# Patient Record
Sex: FEMALE | Race: WHITE | NOT HISPANIC OR LATINO | Employment: OTHER | URBAN - METROPOLITAN AREA
[De-identification: names, ages, dates, MRNs, and addresses within clinical notes are randomized per-mention and may not be internally consistent; named-entity substitution may affect disease eponyms.]

---

## 2017-01-12 ENCOUNTER — GENERIC CONVERSION - ENCOUNTER (OUTPATIENT)
Dept: OTHER | Facility: OTHER | Age: 82
End: 2017-01-12

## 2017-01-12 ENCOUNTER — APPOINTMENT (OUTPATIENT)
Dept: LAB | Facility: CLINIC | Age: 82
End: 2017-01-12
Payer: MEDICARE

## 2017-01-12 ENCOUNTER — TRANSCRIBE ORDERS (OUTPATIENT)
Dept: LAB | Facility: CLINIC | Age: 82
End: 2017-01-12

## 2017-01-12 DIAGNOSIS — E11.42 DIABETIC SENSORIMOTOR NEUROPATHY (HCC): ICD-10-CM

## 2017-01-12 DIAGNOSIS — E53.9 UNSPECIFIED VITAMIN B DEFICIENCY: ICD-10-CM

## 2017-01-12 DIAGNOSIS — Z79.4 ENCOUNTER FOR LONG-TERM (CURRENT) USE OF INSULIN (HCC): ICD-10-CM

## 2017-01-12 DIAGNOSIS — I25.10 UNSPECIFIED CARDIOVASCULAR DISEASE: ICD-10-CM

## 2017-01-12 DIAGNOSIS — I10 ESSENTIAL HYPERTENSION, MALIGNANT: ICD-10-CM

## 2017-01-12 DIAGNOSIS — E11.42 DIABETIC SENSORIMOTOR NEUROPATHY (HCC): Primary | ICD-10-CM

## 2017-01-12 DIAGNOSIS — Z92.29 HX: LONG TERM ANTICOAGULANT USE: ICD-10-CM

## 2017-01-12 DIAGNOSIS — E78.00 PURE HYPERCHOLESTEROLEMIA: ICD-10-CM

## 2017-01-12 DIAGNOSIS — N18.30 CHRONIC KIDNEY DISEASE, STAGE III (MODERATE) (HCC): ICD-10-CM

## 2017-01-12 LAB
ALBUMIN SERPL BCP-MCNC: 3.1 G/DL (ref 3.5–5)
ALP SERPL-CCNC: 98 U/L (ref 46–116)
ALT SERPL W P-5'-P-CCNC: 22 U/L (ref 12–78)
ANION GAP SERPL CALCULATED.3IONS-SCNC: 6 MMOL/L (ref 4–13)
AST SERPL W P-5'-P-CCNC: 14 U/L (ref 5–45)
BACTERIA UR QL AUTO: ABNORMAL /HPF
BILIRUB SERPL-MCNC: 0.6 MG/DL (ref 0.2–1)
BILIRUB UR QL STRIP: NEGATIVE
BUN SERPL-MCNC: 18 MG/DL (ref 5–25)
CALCIUM SERPL-MCNC: 9.1 MG/DL (ref 8.3–10.1)
CHLORIDE SERPL-SCNC: 107 MMOL/L (ref 100–108)
CHOLEST SERPL-MCNC: 84 MG/DL (ref 50–200)
CLARITY UR: ABNORMAL
CO2 SERPL-SCNC: 30 MMOL/L (ref 21–32)
COLOR UR: ABNORMAL
CREAT SERPL-MCNC: 1.01 MG/DL (ref 0.6–1.3)
CREAT UR-MCNC: 125 MG/DL
EST. AVERAGE GLUCOSE BLD GHB EST-MCNC: 151 MG/DL
GFR SERPL CREATININE-BSD FRML MDRD: 52.3 ML/MIN/1.73SQ M
GLUCOSE SERPL-MCNC: 95 MG/DL (ref 65–140)
GLUCOSE UR STRIP-MCNC: NEGATIVE MG/DL
HBA1C MFR BLD: 6.9 % (ref 4.2–6.3)
HDLC SERPL-MCNC: 35 MG/DL (ref 40–60)
HGB UR QL STRIP.AUTO: ABNORMAL
INR PPP: 2.77 (ref 0.86–1.16)
KETONES UR STRIP-MCNC: NEGATIVE MG/DL
LDLC SERPL CALC-MCNC: 29 MG/DL (ref 0–100)
LEUKOCYTE ESTERASE UR QL STRIP: NEGATIVE
MICROALBUMIN UR-MCNC: 13.8 MG/L (ref 0–20)
MICROALBUMIN/CREAT 24H UR: 11 MG/G CREATININE (ref 0–30)
NITRITE UR QL STRIP: NEGATIVE
NON-SQ EPI CELLS URNS QL MICRO: ABNORMAL /HPF
PH UR STRIP.AUTO: 7 [PH] (ref 4.5–8)
POTASSIUM SERPL-SCNC: 4.6 MMOL/L (ref 3.5–5.3)
PROT SERPL-MCNC: 7.4 G/DL (ref 6.4–8.2)
PROT UR STRIP-MCNC: ABNORMAL MG/DL
PROTHROMBIN TIME: 28.8 SECONDS (ref 12–14.3)
RBC #/AREA URNS AUTO: ABNORMAL /HPF
SODIUM SERPL-SCNC: 143 MMOL/L (ref 136–145)
SP GR UR STRIP.AUTO: 1.02 (ref 1–1.03)
TRIGL SERPL-MCNC: 98 MG/DL
TSH SERPL DL<=0.05 MIU/L-ACNC: 2.74 UIU/ML (ref 0.36–3.74)
UROBILINOGEN UR QL STRIP.AUTO: 0.2 E.U./DL
WBC #/AREA URNS AUTO: ABNORMAL /HPF

## 2017-01-12 PROCEDURE — 83883 ASSAY NEPHELOMETRY NOT SPEC: CPT

## 2017-01-12 PROCEDURE — 81001 URINALYSIS AUTO W/SCOPE: CPT

## 2017-01-12 PROCEDURE — 80061 LIPID PANEL: CPT

## 2017-01-12 PROCEDURE — 84443 ASSAY THYROID STIM HORMONE: CPT

## 2017-01-12 PROCEDURE — 85610 PROTHROMBIN TIME: CPT

## 2017-01-12 PROCEDURE — 80053 COMPREHEN METABOLIC PANEL: CPT

## 2017-01-12 PROCEDURE — 83036 HEMOGLOBIN GLYCOSYLATED A1C: CPT

## 2017-01-12 PROCEDURE — 82043 UR ALBUMIN QUANTITATIVE: CPT

## 2017-01-12 PROCEDURE — 36415 COLL VENOUS BLD VENIPUNCTURE: CPT

## 2017-01-12 PROCEDURE — 82570 ASSAY OF URINE CREATININE: CPT

## 2017-01-13 ENCOUNTER — GENERIC CONVERSION - ENCOUNTER (OUTPATIENT)
Dept: OTHER | Facility: OTHER | Age: 82
End: 2017-01-13

## 2017-01-13 LAB
KAPPA LC FREE SER-MCNC: 88.77 MG/L (ref 3.3–19.4)
KAPPA LC FREE/LAMBDA FREE SER: 3.36 {RATIO} (ref 0.26–1.65)
LAMBDA LC FREE SERPL-MCNC: 26.44 MG/L (ref 5.71–26.3)

## 2017-02-21 ENCOUNTER — GENERIC CONVERSION - ENCOUNTER (OUTPATIENT)
Dept: OTHER | Facility: OTHER | Age: 82
End: 2017-02-21

## 2017-02-21 ENCOUNTER — APPOINTMENT (OUTPATIENT)
Dept: LAB | Facility: CLINIC | Age: 82
End: 2017-02-21
Payer: MEDICARE

## 2017-02-21 DIAGNOSIS — I48.91 ATRIAL FIBRILLATION (HCC): ICD-10-CM

## 2017-02-21 LAB
INR PPP: 3.17 (ref 0.86–1.16)
PROTHROMBIN TIME: 31.9 SECONDS (ref 12–14.3)

## 2017-02-21 PROCEDURE — 85610 PROTHROMBIN TIME: CPT

## 2017-02-21 PROCEDURE — 36415 COLL VENOUS BLD VENIPUNCTURE: CPT

## 2017-02-22 ENCOUNTER — GENERIC CONVERSION - ENCOUNTER (OUTPATIENT)
Dept: OTHER | Facility: OTHER | Age: 82
End: 2017-02-22

## 2017-03-23 ENCOUNTER — GENERIC CONVERSION - ENCOUNTER (OUTPATIENT)
Dept: OTHER | Facility: OTHER | Age: 82
End: 2017-03-23

## 2017-03-23 ENCOUNTER — APPOINTMENT (OUTPATIENT)
Dept: LAB | Facility: CLINIC | Age: 82
End: 2017-03-23
Payer: MEDICARE

## 2017-03-23 DIAGNOSIS — I48.91 ATRIAL FIBRILLATION (HCC): ICD-10-CM

## 2017-03-23 LAB
INR PPP: 2.67 (ref 0.86–1.16)
PROTHROMBIN TIME: 28 SECONDS (ref 12–14.3)

## 2017-03-23 PROCEDURE — 36415 COLL VENOUS BLD VENIPUNCTURE: CPT

## 2017-03-23 PROCEDURE — 85610 PROTHROMBIN TIME: CPT

## 2017-04-06 DIAGNOSIS — I48.91 ATRIAL FIBRILLATION (HCC): ICD-10-CM

## 2017-04-24 ENCOUNTER — ALLSCRIPTS OFFICE VISIT (OUTPATIENT)
Dept: OTHER | Facility: OTHER | Age: 82
End: 2017-04-24

## 2017-04-27 ENCOUNTER — APPOINTMENT (OUTPATIENT)
Dept: LAB | Facility: CLINIC | Age: 82
End: 2017-04-27
Payer: MEDICARE

## 2017-04-27 ENCOUNTER — GENERIC CONVERSION - ENCOUNTER (OUTPATIENT)
Dept: OTHER | Facility: OTHER | Age: 82
End: 2017-04-27

## 2017-04-27 DIAGNOSIS — I48.91 ATRIAL FIBRILLATION (HCC): ICD-10-CM

## 2017-04-27 LAB
INR PPP: 3.03 (ref 0.86–1.16)
PROTHROMBIN TIME: 31.8 SECONDS (ref 12.1–14.4)

## 2017-04-27 PROCEDURE — 85610 PROTHROMBIN TIME: CPT

## 2017-04-27 PROCEDURE — 36415 COLL VENOUS BLD VENIPUNCTURE: CPT

## 2017-04-28 ENCOUNTER — GENERIC CONVERSION - ENCOUNTER (OUTPATIENT)
Dept: OTHER | Facility: OTHER | Age: 82
End: 2017-04-28

## 2017-05-12 ENCOUNTER — GENERIC CONVERSION - ENCOUNTER (OUTPATIENT)
Dept: OTHER | Facility: OTHER | Age: 82
End: 2017-05-12

## 2017-05-12 ENCOUNTER — APPOINTMENT (OUTPATIENT)
Dept: LAB | Facility: CLINIC | Age: 82
End: 2017-05-12
Payer: MEDICARE

## 2017-05-12 ENCOUNTER — TRANSCRIBE ORDERS (OUTPATIENT)
Dept: LAB | Facility: CLINIC | Age: 82
End: 2017-05-12

## 2017-05-12 DIAGNOSIS — E11.00 UNCONTROLLED TYPE 2 DIABETES MELLITUS WITH HYPEROSMOLARITY WITHOUT COMA, WITHOUT LONG-TERM CURRENT USE OF INSULIN (HCC): Primary | ICD-10-CM

## 2017-05-12 DIAGNOSIS — I48.91 ATRIAL FIBRILLATION (HCC): ICD-10-CM

## 2017-05-12 LAB
ALBUMIN SERPL BCP-MCNC: 3.3 G/DL (ref 3.5–5)
ALP SERPL-CCNC: 104 U/L (ref 46–116)
ALT SERPL W P-5'-P-CCNC: 20 U/L (ref 12–78)
ANION GAP SERPL CALCULATED.3IONS-SCNC: 5 MMOL/L (ref 4–13)
AST SERPL W P-5'-P-CCNC: 18 U/L (ref 5–45)
BILIRUB SERPL-MCNC: 0.54 MG/DL (ref 0.2–1)
BUN SERPL-MCNC: 18 MG/DL (ref 5–25)
CALCIUM SERPL-MCNC: 8.9 MG/DL (ref 8.3–10.1)
CHLORIDE SERPL-SCNC: 105 MMOL/L (ref 100–108)
CHOLEST SERPL-MCNC: 104 MG/DL (ref 50–200)
CO2 SERPL-SCNC: 30 MMOL/L (ref 21–32)
CREAT SERPL-MCNC: 0.99 MG/DL (ref 0.6–1.3)
EST. AVERAGE GLUCOSE BLD GHB EST-MCNC: 157 MG/DL
GFR SERPL CREATININE-BSD FRML MDRD: 53.6 ML/MIN/1.73SQ M
GLUCOSE P FAST SERPL-MCNC: 111 MG/DL (ref 65–99)
HBA1C MFR BLD: 7.1 % (ref 4.2–6.3)
HDLC SERPL-MCNC: 34 MG/DL (ref 40–60)
INR PPP: 3.05 (ref 0.86–1.16)
LDLC SERPL CALC-MCNC: 44 MG/DL (ref 0–100)
POTASSIUM SERPL-SCNC: 4.3 MMOL/L (ref 3.5–5.3)
PROT SERPL-MCNC: 7.3 G/DL (ref 6.4–8.2)
PROTHROMBIN TIME: 32 SECONDS (ref 12.1–14.4)
SODIUM SERPL-SCNC: 140 MMOL/L (ref 136–145)
TRIGL SERPL-MCNC: 131 MG/DL

## 2017-05-12 PROCEDURE — 80061 LIPID PANEL: CPT

## 2017-05-12 PROCEDURE — 83036 HEMOGLOBIN GLYCOSYLATED A1C: CPT

## 2017-05-12 PROCEDURE — 85610 PROTHROMBIN TIME: CPT

## 2017-05-12 PROCEDURE — 36415 COLL VENOUS BLD VENIPUNCTURE: CPT

## 2017-05-12 PROCEDURE — 80053 COMPREHEN METABOLIC PANEL: CPT

## 2017-06-29 ENCOUNTER — GENERIC CONVERSION - ENCOUNTER (OUTPATIENT)
Dept: OTHER | Facility: OTHER | Age: 82
End: 2017-06-29

## 2017-06-29 ENCOUNTER — APPOINTMENT (OUTPATIENT)
Dept: LAB | Facility: CLINIC | Age: 82
End: 2017-06-29
Payer: MEDICARE

## 2017-06-29 ENCOUNTER — TRANSCRIBE ORDERS (OUTPATIENT)
Dept: LAB | Facility: CLINIC | Age: 82
End: 2017-06-29

## 2017-06-29 DIAGNOSIS — I48.91 ATRIAL FIBRILLATION, UNSPECIFIED TYPE (HCC): Primary | ICD-10-CM

## 2017-06-29 DIAGNOSIS — I48.91 ATRIAL FIBRILLATION, UNSPECIFIED TYPE (HCC): ICD-10-CM

## 2017-06-29 LAB
INR PPP: 2.94 (ref 0.86–1.16)
PROTHROMBIN TIME: 31.1 SECONDS (ref 12.1–14.4)

## 2017-06-29 PROCEDURE — 36415 COLL VENOUS BLD VENIPUNCTURE: CPT

## 2017-06-29 PROCEDURE — 85610 PROTHROMBIN TIME: CPT

## 2017-07-31 ENCOUNTER — APPOINTMENT (OUTPATIENT)
Dept: LAB | Facility: CLINIC | Age: 82
End: 2017-07-31
Payer: MEDICARE

## 2017-07-31 DIAGNOSIS — I48.91 ATRIAL FIBRILLATION, UNSPECIFIED TYPE (HCC): Primary | ICD-10-CM

## 2017-07-31 LAB
INR PPP: 2.88 (ref 0.86–1.16)
PROTHROMBIN TIME: 30.6 SECONDS (ref 12.1–14.4)

## 2017-07-31 PROCEDURE — 36415 COLL VENOUS BLD VENIPUNCTURE: CPT

## 2017-07-31 PROCEDURE — 85610 PROTHROMBIN TIME: CPT

## 2017-08-11 ENCOUNTER — APPOINTMENT (OUTPATIENT)
Dept: LAB | Facility: CLINIC | Age: 82
End: 2017-08-11
Payer: MEDICARE

## 2017-08-11 DIAGNOSIS — E11.65 UNCONTROLLED TYPE 2 DIABETES MELLITUS WITH COMPLICATION, UNSPECIFIED LONG TERM INSULIN USE STATUS: Primary | ICD-10-CM

## 2017-08-11 DIAGNOSIS — E11.8 UNCONTROLLED TYPE 2 DIABETES MELLITUS WITH COMPLICATION, UNSPECIFIED LONG TERM INSULIN USE STATUS: Primary | ICD-10-CM

## 2017-08-11 LAB
ALBUMIN SERPL BCP-MCNC: 3.1 G/DL (ref 3.5–5)
ALP SERPL-CCNC: 111 U/L (ref 46–116)
ALT SERPL W P-5'-P-CCNC: 20 U/L (ref 12–78)
ANION GAP SERPL CALCULATED.3IONS-SCNC: 7 MMOL/L (ref 4–13)
AST SERPL W P-5'-P-CCNC: 17 U/L (ref 5–45)
BILIRUB SERPL-MCNC: 0.5 MG/DL (ref 0.2–1)
BILIRUB UR QL STRIP: NEGATIVE
BUN SERPL-MCNC: 24 MG/DL (ref 5–25)
CALCIUM SERPL-MCNC: 9.1 MG/DL (ref 8.3–10.1)
CHLORIDE SERPL-SCNC: 105 MMOL/L (ref 100–108)
CHOLEST SERPL-MCNC: 89 MG/DL (ref 50–200)
CLARITY UR: CLEAR
CO2 SERPL-SCNC: 29 MMOL/L (ref 21–32)
COLOR UR: YELLOW
CREAT SERPL-MCNC: 1.12 MG/DL (ref 0.6–1.3)
CREAT UR-MCNC: 30.8 MG/DL
EST. AVERAGE GLUCOSE BLD GHB EST-MCNC: 163 MG/DL
GFR SERPL CREATININE-BSD FRML MDRD: 46 ML/MIN/1.73SQ M
GLUCOSE P FAST SERPL-MCNC: 120 MG/DL (ref 65–99)
GLUCOSE UR STRIP-MCNC: NEGATIVE MG/DL
HBA1C MFR BLD: 7.3 % (ref 4.2–6.3)
HDLC SERPL-MCNC: 33 MG/DL (ref 40–60)
HGB UR QL STRIP.AUTO: NEGATIVE
KETONES UR STRIP-MCNC: NEGATIVE MG/DL
LDLC SERPL CALC-MCNC: 33 MG/DL (ref 0–100)
LEUKOCYTE ESTERASE UR QL STRIP: NEGATIVE
MICROALBUMIN UR-MCNC: <5 MG/L (ref 0–20)
MICROALBUMIN/CREAT 24H UR: <16 MG/G CREATININE (ref 0–30)
NITRITE UR QL STRIP: NEGATIVE
PH UR STRIP.AUTO: 6 [PH] (ref 4.5–8)
POTASSIUM SERPL-SCNC: 4.5 MMOL/L (ref 3.5–5.3)
PROT SERPL-MCNC: 7.5 G/DL (ref 6.4–8.2)
PROT UR STRIP-MCNC: NEGATIVE MG/DL
SODIUM SERPL-SCNC: 141 MMOL/L (ref 136–145)
SP GR UR STRIP.AUTO: 1.01 (ref 1–1.03)
T4 FREE SERPL-MCNC: 1.03 NG/DL (ref 0.76–1.46)
TRIGL SERPL-MCNC: 114 MG/DL
TSH SERPL DL<=0.05 MIU/L-ACNC: 2.84 UIU/ML (ref 0.36–3.74)
UROBILINOGEN UR QL STRIP.AUTO: 0.2 E.U./DL

## 2017-08-11 PROCEDURE — 81003 URINALYSIS AUTO W/O SCOPE: CPT

## 2017-08-11 PROCEDURE — 80061 LIPID PANEL: CPT

## 2017-08-11 PROCEDURE — 82043 UR ALBUMIN QUANTITATIVE: CPT

## 2017-08-11 PROCEDURE — 82570 ASSAY OF URINE CREATININE: CPT

## 2017-08-11 PROCEDURE — 83036 HEMOGLOBIN GLYCOSYLATED A1C: CPT

## 2017-08-11 PROCEDURE — 36415 COLL VENOUS BLD VENIPUNCTURE: CPT

## 2017-08-11 PROCEDURE — 84443 ASSAY THYROID STIM HORMONE: CPT

## 2017-08-11 PROCEDURE — 80053 COMPREHEN METABOLIC PANEL: CPT

## 2017-08-11 PROCEDURE — 84439 ASSAY OF FREE THYROXINE: CPT

## 2017-09-06 ENCOUNTER — GENERIC CONVERSION - ENCOUNTER (OUTPATIENT)
Dept: OTHER | Facility: OTHER | Age: 82
End: 2017-09-06

## 2017-09-06 ENCOUNTER — APPOINTMENT (OUTPATIENT)
Dept: LAB | Facility: CLINIC | Age: 82
End: 2017-09-06
Payer: MEDICARE

## 2017-09-06 DIAGNOSIS — I48.91 ATRIAL FIBRILLATION, UNSPECIFIED TYPE (HCC): Primary | ICD-10-CM

## 2017-09-06 LAB
INR PPP: 2.41 (ref 0.86–1.16)
PROTHROMBIN TIME: 26.5 SECONDS (ref 12.1–14.4)

## 2017-09-06 PROCEDURE — 36415 COLL VENOUS BLD VENIPUNCTURE: CPT

## 2017-09-06 PROCEDURE — 85610 PROTHROMBIN TIME: CPT

## 2017-09-08 ENCOUNTER — GENERIC CONVERSION - ENCOUNTER (OUTPATIENT)
Dept: OTHER | Facility: OTHER | Age: 82
End: 2017-09-08

## 2017-10-05 ENCOUNTER — APPOINTMENT (OUTPATIENT)
Dept: LAB | Facility: CLINIC | Age: 82
End: 2017-10-05
Payer: MEDICARE

## 2017-10-05 ENCOUNTER — LAB CONVERSION - ENCOUNTER (OUTPATIENT)
Dept: OTHER | Facility: OTHER | Age: 82
End: 2017-10-05

## 2017-10-05 DIAGNOSIS — I48.91 ATRIAL FIBRILLATION, UNSPECIFIED TYPE (HCC): Primary | ICD-10-CM

## 2017-10-05 LAB
INR PPP: 2.94 (ref 0.86–1.16)
PROTHROMBIN TIME: 31.1 SECONDS (ref 12.1–14.4)

## 2017-10-05 PROCEDURE — 36415 COLL VENOUS BLD VENIPUNCTURE: CPT

## 2017-10-05 PROCEDURE — 85610 PROTHROMBIN TIME: CPT

## 2017-10-24 ENCOUNTER — ALLSCRIPTS OFFICE VISIT (OUTPATIENT)
Dept: OTHER | Facility: OTHER | Age: 82
End: 2017-10-24

## 2017-10-25 NOTE — PROGRESS NOTES
Assessment  Assessed    1  Adequate anticoagulation on anticoagulant therapy (V58 61) (Z79 01)   2  Arteriosclerosis of coronary artery (414 00) (I25 10)   3  Atrial fibrillation (427 31) (I48 91)   4  Benign essential hypertension (401 1) (I10)   5  Leg swelling (729 81) (M79 89)    Discussion/Summary  Cardiology Discussion Summary Free Text Note Form [facility]:   Patient with multiple medical problems who seems to be doing reasonably well from cardiac standpoint  Previous studies reviewed with patient  Medications reviewed and possible side effects discussed  concepts of cardiovascular disease , signs and symptoms of heart disease  Dietary and risk factor modification reinforced  All questions answered  Safety measures reviewed  Patient advised to report any problems prompting medical attention  Patient understands the risks and benefits of anticoagulation to prevent thromboembolic risk from atrial fibrillation  Target INR 2 to 3  Patient will continue to follow up with pacemaker clinic  Importance of salt restriction reinforced  All her medications were reviewed  Patient will continue to follow up with her primary care physician  Followup in 6 months  Goals and Barriers: The patient has the current Goals: Chronic anticoagulation  The patent has the current Barriers: None  Patient's Capacity to Self-Care: Patient is able to Self-Care  Patient Education: Educational resources provided: Please see discussion summary  Medication SE Review and Pt Understands Tx: Possible side effects of new medications were reviewed with the patient/guardian today  Counseling Documentation With Imm: The patient, patient's family was counseled regarding instructions for management,-- risk factor reductions,-- impressions,-- risks and benefits of treatment options,-- importance of compliance with treatment        Chief Complaint  Chief Complaint Chronic Condition [facility]: Patient is here today for follow up of chronic conditions described in HPI  History of Present Illness  Cardiology [facility] Free Text Note Form [facility]: Patient presents for follow-up visit  Patient denies any history of chest pain  No shortness of breath out of the ordinary  No history of leg edema more than usual  No history of orthopnea PND  Patient is on anticoagulation  Patient denies any bleeding issues  She reports that she has been compliant with all her present medications  She is also regular with her pacemaker checks  Review of Systems  Cardiology Female ROS:     Cardiac: as noted in HPI  Skin: No complaints of nonhealing sores or skin rash  Genitourinary: No complaints of recurrent urinary tract infections, frequent urination at night, difficult urination, blood in urine, kidney stones, loss of bladder control, kidney problems, denies any birth control or hormone replacement, is not post menopausal, not currently pregnant  Psychological: anxiety  General: No complaints of trouble sleeping, lack of energy, fatigue, appetite changes, weight changes, fever, frequent infections, or night sweats  Respiratory: No complaints of shortness of breath, cough with sputum, or wheezing  HEENT: No complaints of serious problems, hearing problems, nose problems, throat problems, or snoring  Gastrointestinal: No complaints of liver problems, nausea, vomiting, heartburn, constipation, bloody stools, diarrhea, problems swallowing, adbominal pain, or rectal bleeding  Hematologic: No complaints of bleeding disorders, anemia, blood clots, or excessive brusing  Neurological: No complaints of numbness, tingling, dizziness, weakness, seizures, headaches, syncope or fainting, AM fatigue, daytime sleepiness, no witnessed apnea episodes  Musculoskeletal: arthritis   ROS Reviewed:   ROS reviewed  Active Problems  Problems    1  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)   2  Adequate anticoagulation on anticoagulant therapy (U08 61) (Z79 01)   3  Arteriosclerosis of coronary artery (414 00) (I25 10)   4  Atherosclerotic peripheral vascular disease (440 20) (I70 209)   5  Atrial fibrillation (427 31) (I48 91)   6  Benign essential hypertension (401 1) (I10)   7  Callus (700) (L84)   8  Capsulitis (726 90) (M77 9)   9  Chronic diastolic congestive heart failure (428 32,428 0) (I50 32)   10  Coronary atherosclerosis of native coronary artery (414 01) (I25 10)   11  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)   12  Diabetes mellitus with polyneuropathy (250 60,357 2) (E11 42)   13  Difficulty in walking (719 7) (R26 2)   14  Dyspnea on effort (786 09) (R06 09)   15  Fissure of skin (709 8) (R23 4)   16  Hyperlipidemia (272 4) (E78 5)   17  Hypertension (401 9) (I10)   18  Ingrowing nail (703 0) (L60 0)   19  Leg swelling (729 81) (M79 89)   20  Onychomycosis (110 1) (B35 1)   21  Pain in extremity (729 5) (M79 609)   22  Presence of cardiac pacemaker (V45 01) (Z95 0)   23  Sinoatrial node dysfunction (427 81) (I49 5)   24  Tinea pedis (110 4) (B35 3)   25  Xerosis cutis (706 8) (L85 3)    Past Medical History  Problems    1  Anticoagulant long-term use (V58 61) (Z79 01)   2  Atrial fibrillation (427 31) (I48 91)   3  History of Congestive heart failure (428 0) (I50 9)   4  History of Difficulty breathing (786 09) (R06 89)   5  History of chest pain (V13 89) (Z87 898)   6  History of shortness of breath (V13 89) (Z87 898)   7  History of sinoatrial node dysfunction (V12 59) (Z86 79)   8  Hyperlipidemia (272 4) (E78 5)   9  Hypertension (401 9) (I10)   10  History of Long term use of drug (V58 69) (Z79 899)   11  Personal history of coronary atherosclerosis (V12 59) (Z86 79)   12  History of Status post angioplasty (V45 89) (Z98 62)   13  History of Type 2 diabetes mellitus (250 00) (E11 9)  Active Problems And Past Medical History Reviewed: The active problems and past medical history were reviewed and updated today  Surgical History  Problems    1  History of Appendectomy   2  History of Cardiac Cath Procedure Outcome: Successful   3  History of Cataract Surgery   4  History of Cath Laser Angioplasty   5  History of Cath Stent Placement   6  History of Cholecystectomy   7  History of Pacemaker Placement   8  History of PTCA   9  History of Tubal Ligation  Surgical History Reviewed: The surgical history was reviewed and updated today  Family History  Mother    1  Family history of coronary artery disease (V17 3) (Z82 49)  Father    2  Family history of coronary artery disease (V17 3) (Z82 49)   3  Family history of Hypertension (401 9) (I10)  Family History    4  Family history of Coronary Artery Disease (V17 49)  Family History Reviewed: The family history was reviewed and updated today  Social History  Problems    · Denied: History of Alcohol Use (History)   · Never A Smoker  Social History Reviewed: The social history was reviewed and updated today  Current Meds   1  Aspirin EC 81 MG Oral Tablet Delayed Release; TAKE 1 TABLET DAILY; Therapy: (Recorded:08Jan2014) to Recorded   2  Atorvastatin Calcium 20 MG Oral Tablet; Therapy: 01YXI8493 to Recorded   3  BD Pen Needle Mini U/F 31G X 5 MM Miscellaneous; Therapy: 51SCW8681 to (Evaluate:11Ojw1673) Recorded   4  BD Pen Needle Short U/F 31G X 8 MM Miscellaneous; Therapy: 33IXF6836 to Recorded   5  Enalapril Maleate 10 MG Oral Tablet; TAKE 1 TABLET DAILY; Therapy: (Recorded:08Jan2014) to Recorded   6  Furosemide 40 MG Oral Tablet; TAKE 1 TABLET TWICE DAILY; Therapy: 16ETQ0956 to (Erick Fay)  Requested for: 27Jun2017; Last   Rx:27Jun2017 Ordered   7  HumaLOG KwikPen SOLN; 5 BREAKFAST 9 AT LUNCH  11 AT [facility]; Therapy: (Recorded:08Jan2014) to Recorded   8  Klor-Con 10 10 MEQ Oral Tablet Extended Release; TAKE 1 TABLET TWICE DAILY    Requested for: 78AAI1983; Last Rx:09Apr2015 Ordered   9   Levemir FlexPen 100 UNIT/ML SOPN;   Therapy: 55QWI6112 to (Evaluate:58Ved1637) Recorded   10  Metoprolol Tartrate 50 MG Oral Tablet; TAKE 1 TABLET TWICE DAILY; Therapy: (Recorded:08Jan2014) to Recorded   11  Onglyza 2 5 MG Oral Tablet; 1 PO QD; Therapy: (Recorded:08Jan2014) to Recorded   12  Potassium Chloride ER 10 MEQ Oral Tablet Extended Release; TAKE 1 TABLET TWICE    DAILY; Therapy: 62BPZ2034 to (Evaluate:07Apr2018)  Requested for: 12Apr2017; Last    Rx:12Apr2017 Ordered   13  Vitamin B12 TABS; 2000 MCG 1 TAB PO QD; Therapy: (Recorded:08Jan2014) to Recorded   14  Warfarin Sodium 5 MG Oral Tablet; TAKE 1 TO 2 TABLETS BY MOUTH EVERY DAY AS    DIRECTED; Therapy: 93ZBU3794 to (Lupe Smith)  Requested for: 01Sep2017; Last    Rx:01Sep2017 Ordered  Medication List Reviewed: The medication list was reviewed and updated today  Allergies  Medication    1  Anacin TABS   2  Neosporin SOLN    Vitals  Vital Signs    Recorded: 09VTW0066 09:22AM   Heart Rate 74   Systolic 061   Diastolic 62   Height 5 ft 7 in   Weight 203 lb    BMI Calculated 31 79   BSA Calculated 2 03   O2 Saturation 97     Physical Exam    Constitutional   General appearance: No acute distress, well appearing and well nourished  Eyes   Conjunctiva and Sclera examination: Conjunctiva pink, sclera anicteric  Ears, Nose, Mouth, and Throat - Oropharynx: Clear, nares are clear, mucous membranes are moist    Neck   Neck and thyroid: Normal, supple, trachea midline, no thyromegaly  Pulmonary   Respiratory effort: No increased work of breathing or signs of respiratory distress  Auscultation of lungs: Clear to auscultation, no rales, no rhonchi, no wheezing, good air movement  Cardiovascular   Auscultation of heart: Abnormal  -- Irregularly irregular  Carotid pulses: Normal, 2+ bilaterally  Peripheral vascular exam: Normal pulses throughout, no tenderness, erythema or swelling  Pedal pulses: Normal, 2+ bilaterally      Examination of extremities for edema and/or varicosities: Abnormal  -- 1+ edema  Abdomen   Abdomen: Non-tender and no distention  Liver and spleen: No hepatomegaly or splenomegaly  Musculoskeletal Gait and station: Normal gait  -- Digits and nails: Normal without clubbing or cyanosis  -- Inspection/palpation of joints, bones, and muscles: Normal, ROM normal     Skin - Skin and subcutaneous tissue: Normal without rashes or lesions  Skin is warm and well perfused, normal turgor  Neurologic - Cranial nerves: II - XII intact  -- Speech: Normal     Psychiatric - Orientation to person, place, and time: Normal -- Mood and affect: Normal       Results/Data  Diagnostic Studies Reviewed Cardio: I personally reviewed the recording/images in the office today  My interpretation follows  ICD/ Pacer Interpretation Pacemaker interrogation data reviewed  Normally functioning pacemaker  Normal lead impedance  Battery status is good  Reviewed with patient and family        Future Appointments    Date/Time Provider Specialty Site   10/12/2018 09:00 AM Cardiology, Pacemaker Clin [facility] 391     Signatures   Electronically signed by : TONY Alexander ; Oct 24 2017  2:18PM EST                       (Author)

## 2017-11-02 ENCOUNTER — APPOINTMENT (OUTPATIENT)
Dept: LAB | Facility: CLINIC | Age: 82
End: 2017-11-02
Payer: MEDICARE

## 2017-11-02 ENCOUNTER — GENERIC CONVERSION - ENCOUNTER (OUTPATIENT)
Dept: OTHER | Facility: OTHER | Age: 82
End: 2017-11-02

## 2017-11-02 DIAGNOSIS — I48.91 ATRIAL FIBRILLATION, UNSPECIFIED TYPE (HCC): Primary | ICD-10-CM

## 2017-11-02 LAB
INR PPP: 2.83 (ref 0.86–1.16)
PROTHROMBIN TIME: 30.1 SECONDS (ref 12.1–14.4)

## 2017-11-02 PROCEDURE — 85610 PROTHROMBIN TIME: CPT

## 2017-11-02 PROCEDURE — 36415 COLL VENOUS BLD VENIPUNCTURE: CPT

## 2017-11-03 ENCOUNTER — GENERIC CONVERSION - ENCOUNTER (OUTPATIENT)
Dept: OTHER | Facility: OTHER | Age: 82
End: 2017-11-03

## 2017-12-06 ENCOUNTER — GENERIC CONVERSION - ENCOUNTER (OUTPATIENT)
Dept: OTHER | Facility: OTHER | Age: 82
End: 2017-12-06

## 2017-12-06 ENCOUNTER — APPOINTMENT (OUTPATIENT)
Dept: LAB | Facility: CLINIC | Age: 82
End: 2017-12-06
Payer: MEDICARE

## 2017-12-06 DIAGNOSIS — I48.91 ATRIAL FIBRILLATION, UNSPECIFIED TYPE (HCC): Primary | ICD-10-CM

## 2017-12-06 LAB
INR PPP: 3.03 (ref 0.86–1.16)
PROTHROMBIN TIME: 31.8 SECONDS (ref 12.1–14.4)

## 2017-12-06 PROCEDURE — 85610 PROTHROMBIN TIME: CPT

## 2017-12-06 PROCEDURE — 36415 COLL VENOUS BLD VENIPUNCTURE: CPT

## 2018-01-09 NOTE — RESULT NOTES
Verified Results  (1) PT WITH INR 29Jun2017 11:36AM Crystal Zamora     Test Name Result Flag Reference   INR 2 94 H 0 86-1 16   PT 31 1 seconds H 12 1-14 4

## 2018-01-09 NOTE — PROGRESS NOTES
REPORT NAME: Patient Visit Summary Report   VISIT DATE: 12/9/2016  VISIT TIME: 1:21 PM EST  PATIENT NAME: Lazaro Cho  MEDICAL RECORD NUMBER: [id number]  SOCIAL SECURITY NUMBER:    YOB: 1934  AGE: 80  REFERRING PHYSICIAN: Crystal Zamora  SUPERVISING CLINICIAN: Crystal Zamora  HEALTH CARE PROFESSIONAL: Lay Paniagua  [facility] [address]  [address] PHONE:  [phone]  DIAGNOSIS 1: Atrial Fibrillation / 427 31  DIAGNOSIS 2: Long-term (current) use of anticoagulants / V58 61  DIAGNOSIS 3:   DIAGNOSIS 4:   INR RANGE: 2 - 3  INR GOAL: 2 5  TREATMENT START DATE:   TREATMENT END DATE:    NEXT VISIT:       VISIT RESULTS   ENCOUNTER NUMBER:   TEST LOCATION: Other - see notes  TEST TYPE: Outside Lab (Venipuncture)  VISIT TYPE:   CURRENT INR: 3 15 PROTIME:   SPECIMEN COL AND RPT DATE: 12/9/2016 1:21 PM  EST     VITAL SIGNS  PULSE:  B/P:  WEIGHT:  HEIGHT:  TEMP:     CURRENT ANTICOAGULANT DOSING SCHEDULE  DOSE SIZE: 5mg    ANTICOAGULANT TYPE: COUMADIN  DOSING REGIMEN  Sun       Mon       Tues      Wed       Thurs     Fri       Sat  Total/Wk   5         5         5         5         5         5         5         35    PATIENT MEDICATION INSTRUCTION: Yes  PATIENT NUTRITIONAL COUNSELING: No  PATIENT BRUISING INSTRUCTION: No      LAST EDUCATION DATE:       PREVIOUS VISIT  INFORMATION  VISITDATE   INR Goal  INR   Sun     Mon     Tues    Wed     Thurs   Fri  Sat     Total/wk  12/9/2016   2 5       3 15  5       5       5       5       5       5  5       35  10/31/2016  2 5       3 12  5       5       5        5       5       5  5       35  9/23/2016   2 5       3 24  5       5       5       5       5       5  5       35  8/26/2016   2 5       2 65  5       5       5       5       5       5  5       35    ADDITIONAL PREVIOUS VISIT INFORMATION   VISITDATE   PRIMARY RX               DOSE      CrCl  12/9/2016   COUMADIN                 5mg 10/31/2016  COUMADIN                 5mg                 9/23/2016   COUMADIN                 5mg                 8/26/2016   COUMADIN                  5mg                     OTHER CURRENT MEDICATIONS: COUMADIN      PROGRESS NOTES: Same Dose rechk 2 weeks    PATIENT INSTRUCTIONS: LMOM    TEST LOCATION: Other - see notes    Electronically signed by: Wicho Krishna  on 12/9/2016 at 1:21 PM EST              Electronically signed by:Crystal CORTES    Dec 11 2016  1:00PM EST

## 2018-01-09 NOTE — RESULT NOTES
Verified Results  (1) PT WITH INR [id number] 01:12PM Crystal Zamora     Test Name Result Flag Reference   INR 2 93 H 0 86-1 16   PT 29 0 seconds H 11 8-14 1

## 2018-01-09 NOTE — PROGRESS NOTES
REPORT NAME: Progress Notes Report  VISIT DATE: 11/3/2017  VISIT TIME: 8:56 AM EDT  PATIENT NAME: Odin Bradley  MEDICAL RECORD NUMBER: [id number]  YOB: 1934  AGE: 80  REFERRING PHYSICIAN: Crystal Zamora  SUPERVISING CLINICIAN: Alisia Fatima CARE PROVIDER: Laverne Echavarria  PATIENT HOME ADDRESS: [address]  [address] PHONE: [phone]  SOCIAL SECURITY NUMBER:   DIAGNOSIS 1: Atrial Fibrillation / 427 31  DIAGNOSIS 2: Long-term (current) use of anticoagulants / V58 61  INR RANGE: 2 - 3  INR GOAL: 2 5  TREATMENT START DATE:   TREATMENT END DATE:   NEXT VISIT:     VISIT RESULTS  ENCOUNTER NUMBER:   TEST LOCATION: Other - see notes  TEST TYPE: Outside Lab (Venipuncture)  VISIT TYPE:   CURRENT INR: 2 83 PROTIME:   SPECIMEN COL AND RPT DATE: 11/3/2017 8:56 AM  EDT  VITAL SIGNS  PULSE:  BP: / WEIGHT:  HEIGHT:  TEMP:   CURRENT ANTICOAGULANT DOSING SCHEDULE  DOSE SIZE: 5mg    ANTICOAGULANT TYPE: COUMADIN  DOSING REGIMEN  Sun       Mon Tues Wed Thurs Fri       Sat  Total/Wk  5         5         5         5         5         5         5         35  PATIENT MEDICATION INSTRUCTION: Yes  PATIENT NUTRITIONAL COUNSELING: No  PATIENT BRUISING INSTRUCTION: No  LAST EDUCATION DATE:   PREVIOUS VISIT INFORMATION  VISITDATE  INRGoal INR   Sun    Mon    Tues   Wed    Thurs  Fri    Sat  Total/wk  11/3/2017   2 5     2 83  5      5      5      5      5      5      5  35  9/8/2017    2 5     2 41  5      5      5      5      5      5      5  35  7/31/2017   2 5     2 8   5      5      5      5      5      5      5  35  5/12/2017   2 5     3 05  5      5      5      5      5      5      5  35  ADDITIONAL PREVIOUS VISIT INFORMATION  VISITDATE   PRIMARY RX               DOSE      CrCl  11/3/2017   COUMADIN                 5mg  9/8/2017    COUMADIN                 5mg  7/31/2017   COUMADIN                 5mg  5/12/2017   COUMADIN                 5mg  OTHER CURRENT MEDICATIONS:  COUMADIN  PROGRESS NOTES: Same dose rechk 2 weeks  PATIENT INSTRUCTIONS: Spoke with pt  TEST LOCATION: Other - see notes, , ,   INBOUND LAB DATA:  Lab       Lab Value Col Date                 Rpt Date                 Lab  Reference Range  Electronically signed by: Dez Partida on 11/3/2017 8:56 AM EDT

## 2018-01-10 NOTE — RESULT NOTES
Verified Results  (1) PT WITH INR [id number] 12:05PM Crystal Zamora     Test Name Result Flag Reference   INR 2 66 H 0 86-1 16   PT 27 0 seconds H 11 8-14 1

## 2018-01-10 NOTE — RESULT NOTES
Verified Results  (1) PT WITH INR [id number] 11:50AM Rosalino Tolbert Order Number: [id number]     Test Name Result Flag Reference   INR 3 03 H 0 86-1 16   PT 31 8 seconds H 12 1-14 4

## 2018-01-11 NOTE — PROGRESS NOTES
REPORT NAME: Patient Visit Summary Report   VISIT DATE: 7/13/2016  VISIT TIME: 4:43 PM EDT  PATIENT NAME: Jenna Hartman  MEDICAL RECORD NUMBER: [id number]  SOCIAL SECURITY NUMBER:   YOB: 1934  AGE: 80  REFERRING PHYSICIAN: Crystal Zamora  SUPERVISING CLINICIAN: Crystal Zamora  HEALTH CARE PROFESSIONAL: Mario Clayton  [facility] [address]  [address] [address]  PATIENT HOME PHONE: [phone]  DIAGNOSIS 1: Atrial Fibrillation / 427 31  DIAGNOSIS 2: Long-term (current) use of anticoagulants / V58 61  DIAGNOSIS 3:   DIAGNOSIS 4:   INR RANGE: 2 - 3  INR GOAL: 2 5  TREATMENT START DATE:   TREATMENT END DATE:   NEXT VISIT:       VISIT RESULTS   ENCOUNTER NUMBER:   TEST LOCATION: Other - see notes  TEST TYPE: Outside Lab (Venipuncture)  VISIT TYPE:   CURRENT INR: 2 91 PROTIME:   SPECIMEN COL AND RPT DATE: 7/13/2016 4:43 PM  EDT    VITAL SIGNS  PULSE:  B/P:  WEIGHT:  HEIGHT:  TEMP:     CURRENT ANTICOAGULANT DOSING SCHEDULE  DOSE SIZE: 5mg    ANTICOAGULANT TYPE: COUMADIN  DOSING REGIMEN  Sun       Mon       Tues      Wed       Thurs     Fri       Sat  Total/Wk  5         5         5         5         5         5         5         35    PATIENT MEDICATION INSTRUCTION: Yes  PATIENT NUTRITIONAL COUNSELING: No  PATIENT BRUISING INSTRUCTION: No      LAST EDUCATION DATE:       PREVIOUS VISIT INFORMATION  VISITDATE   INR Goal  INR   Sun     Mon     Tues    Wed     Thurs   Fri  Sat     Total/wk  7/13/2016   2 5       2 91  5       5       5       5       5       5  5       35  6/1/2016    2 5       3 17  5       5       5       5       5       5  5       35  4/28/2016   2 5       2 93  5       5       5       5       5       5  5       35  4/8/2016    2 5       3 15  5       5       5       5       5       5  5       35    ADDITIONAL PREVIOUS VISIT INFORMATION  VISITDATE   PRIMARY RX               DOSE      CrCl  7/13/2016   COUMADIN                 5mg                 6/1/2016 COUMADIN                 5mg                 4/28/2016   COUMADIN                 5mg                 4/8/2016    COUMADIN                 5mg                     OTHER CURRENT MEDICATIONS: COUMADIN      PROGRESS NOTES: Same dose rechk 2 weeks    PATIENT INSTRUCTIONS: Spoke with pt    TEST LOCATION: Other - see notes    Electronically signed by: Jaswinder Reza on 7/13/2016 at 4:43 PM EDT

## 2018-01-11 NOTE — PROGRESS NOTES
REPORT NAME: Progress Notes Report  VISIT DATE: 3/23/2017  VISIT TIME: 11:32 AM EDT  PATIENT NAME: Jessi Irizarry  MEDICAL RECORD NUMBER: [id number]  YOB: 1934  AGE: 80  REFERRING PHYSICIAN: Crystal Zamora  SUPERVISING CLINICIAN: Alisia Fatima CARE PROVIDER: Quyen Scott  PATIENT HOME ADDRESS: [place] [address]  [address] PHONE: [phone]  SOCIAL SECURITY NUMBER:   DIAGNOSIS 1: Atrial Fibrillation / 427 31  DIAGNOSIS 2: Long-term (current) use of anticoagulants / V58 61  INR RANGE: 2 - 3  INR GOAL: 2 5  TREATMENT START DATE:   TREATMENT END DATE:   NEXT VISIT:     VISIT RESULTS  ENCOUNTER NUMBER:   TEST LOCATION: Other - see notes  TEST TYPE: Outside Lab (Venipuncture)  VISIT TYPE:   CURRENT INR: 2 67 PROTIME:   SPECIMEN COL AND RPT DATE: 3/23/2017 11:32 AM  EDT  VITAL SIGNS  PULSE:  BP: / WEIGHT:  HEIGHT:  TEMP:   CURRENT ANTICOAGULANT DOSING SCHEDULE  DOSE SIZE: 5mg    ANTICOAGULANT TYPE: COUMADIN  DOSING REGIMEN  Sun       Mon Tues Wed Thurs Fri       Sat  Total/Wk  5         5         5         5         5         5         5         35  PATIENT MEDICATION INSTRUCTION: Yes  PATIENT NUTRITIONAL COUNSELING: No  PATIENT BRUISING INSTRUCTION: No  LAST EDUCATION DATE:   PREVIOUS VISIT INFORMATION  VISITDATE  INRGoal INR   Sun    Mon    Tues   Wed    Thurs  Fri    Sat  Total/wk  3/23/2017   2 5     2 67  5      5      5      5      5      5      5  35  2/22/2017   2 5     3 17  5      5      5      5      5      5      5  35  1/13/2017   2 5     2 77  5      5      5      5      5      5      5  35  12/9/2016   2 5     3 15  5      5      5      5      5      5      5  35  ADDITIONAL PREVIOUS VISIT INFORMATION  VISITDATE   PRIMARY RX               DOSE      CrCl  3/23/2017   COUMADIN                 5mg  2/22/2017   COUMADIN                 5mg  1/13/2017   COUMADIN                 5mg  12/9/2016   COUMADIN 5mg  OTHER CURRENT MEDICATIONS:  COUMADIN  PROGRESS NOTES: same dose  5mg daily  recheck 2 weeks  PATIENT INSTRUCTIONS:   TEST LOCATION: Other - see notes, , ,   INBOUND LAB DATA:  Lab       Lab Value Col Date                 Rpt Date                 Lab  Reference Range  Electronically signed by: Navya Gregory on 3/31/2017 11:32 AM [place]

## 2018-01-11 NOTE — RESULT NOTES
Verified Results  (1) PT WITH INR 01Jun2016 10:53AM Crystal Zamora     Test Name Result Flag Reference   INR 3 17 H 0 86-1 16   PT 31 9 seconds H 12 0-14 3

## 2018-01-12 NOTE — PROGRESS NOTES
REPORT NAME: Progress Notes Report  VISIT DATE: 4/28/2017  VISIT TIME: 9:47 AM EDT  PATIENT NAME: Jh Abreu  MEDICAL RECORD NUMBER: [id number]  YOB: 1934  AGE: 80  REFERRING  PHYSICIAN: Crystal Zamora  SUPERVISING CLINICIAN: Alisia Fatima CARE PROVIDER: Lamine Gaona  PATIENT HOME ADDRESS: [place] [address]  [place] PHONE: [phone]  SOCIAL SECURITY NUMBER:    DIAGNOSIS 1: Atrial Fibrillation / 427 31  DIAGNOSIS 2: Long-term (current) use of anticoagulants / V58 61  INR RANGE: 2 - 3  INR GOAL: 2 5  TREATMENT START DATE:   TREATMENT END DATE:   NEXT VISIT:     VISIT RESULTS  ENCOUNTER  NUMBER:   TEST LOCATION: Other - see notes  TEST TYPE: Outside Lab (Venipuncture)  VISIT TYPE:   CURRENT INR: 3 03 PROTIME:   SPECIMEN COL AND RPT DATE: 4/28/2017 9:47 AM  EDT  VITAL SIGNS  PULSE:  BP: / WEIGHT:  HEIGHT:  TEMP:    CURRENT ANTICOAGULANT DOSING SCHEDULE  DOSE SIZE: 5mg    ANTICOAGULANT TYPE: COUMADIN  DOSING REGIMEN  Sun       Mon Tues Wed Thurs Fri       Sat  Total/Wk  5         5         5         5         5         5          5         35  PATIENT MEDICATION INSTRUCTION: Yes  PATIENT NUTRITIONAL COUNSELING: No  PATIENT BRUISING INSTRUCTION: No  LAST EDUCATION DATE:   PREVIOUS VISIT INFORMATION  VISITDATE  INRGoal INR   Sun    Mon Tues Wed Thurs Fri    Sat  Total/wk  4/28/2017   2 5     3 03  5      5      5      5      5      5      5  35  3/23/2017   2 5     2 67  5      5      5      5      5      5      5  35  2/22/2017   2 5     3 17  5      5      5      5      5       5      5  35  1/13/2017   2 5     2 77  5      5      5      5      5      5      5  35  ADDITIONAL PREVIOUS VISIT INFORMATION  VISITDATE   PRIMARY RX               DOSE      CrCl  4/28/2017   COUMADIN                 5mg  3/23/2017    COUMADIN                 5mg  2/22/2017   COUMADIN                 5mg  1/13/2017   COUMADIN 5mg  OTHER CURRENT MEDICATIONS:  COUMADIN  PROGRESS NOTES: Same dose rechk 2 weeks  PATIENT INSTRUCTIONS: Spoke with pt   TEST LOCATION: Other - see notes, , ,   INBOUND LAB DATA:  Lab       Lab Value Col Date                 Rpt Date                 Lab  Reference Range  Electronically signed by: Barbara Sidhu on 4/28/2017 9:47 AM EDT              Electronically signed by:Crystal CORTES    Apr 28 2017  2:05PM EST

## 2018-01-12 NOTE — RESULT NOTES
Verified Results  (1) PT WITH INR [id number] 07:30AM Jesus Zamora [place] Order Number: [id number]     Test Name Result Flag Reference   INR 3 05 H 0 86-1 16   PT 32 0 seconds H 12 1-14 4

## 2018-01-12 NOTE — PROGRESS NOTES
REPORT NAME: Patient Visit Summary Report   VISIT DATE: 1/13/2017  VISIT TIME: 4:22 PM EST  PATIENT NAME: Nick Delgado  MEDICAL RECORD NUMBER: [id number]  SOCIAL SECURITY NUMBER:   YOB: 1934  AGE: 80  REFERRING PHYSICIAN: Crystal Zamora  SUPERVISING CLINICIAN: Crystal Zamora  HEALTH CARE PROFESSIONAL: Tracy Anaya  PATIENT HOME ADDRESS: [address]  [address] PHONE: [phone]  DIAGNOSIS 1: Atrial Fibrillation / 427 31  DIAGNOSIS 2: Long-term (current) use of anticoagulants / V58 61  DIAGNOSIS 3:   DIAGNOSIS 4:   INR RANGE: 2 - 3  INR GOAL: 2 5  TREATMENT START DATE:   TREATMENT END DATE:   NEXT VISIT:       VISIT RESULTS   ENCOUNTER NUMBER:   TEST LOCATION: Other - see notes  TEST TYPE: Outside Lab (Venipuncture)  VISIT TYPE:   CURRENT INR: 2 77 PROTIME:   SPECIMEN COL AND RPT DATE: 1/13/2017 4:22 PM  EST    VITAL SIGNS  PULSE:  B/P:  WEIGHT:  HEIGHT:  TEMP:     CURRENT ANTICOAGULANT DOSING SCHEDULE  DOSE SIZE: 5mg    ANTICOAGULANT TYPE: COUMADIN  DOSING REGIMEN  Sun       Mon Tues Wed Thurs Fri       Sat  Total/Wk  5         5         5         5         5         5         5         35    PATIENT MEDICATION INSTRUCTION: Yes  PATIENT NUTRITIONAL COUNSELING: No  PATIENT BRUISING INSTRUCTION: No      LAST EDUCATION DATE:       PREVIOUS VISIT INFORMATION  VISITDATE   INR Goal  INR   Sun     Mon Tues Wed Thurs   Fri  Sat     Total/wk  1/13/2017   2 5       2 77  5       5       5       5       5       5  5       35  12/9/2016   2 5       3 15  5       5       5       5       5       5  5       35  10/31/2016  2 5       3 12  5       5       5       5       5       5  5       35  9/23/2016   2 5       3 24  5       5       5       5       5       5  5       35    ADDITIONAL PREVIOUS VISIT INFORMATION  VISITDATE   PRIMARY RX               DOSE      CrCl  1/13/2017   COUMADIN                 5mg                 12/9/2016 COUMADIN                 5mg                 10/31/2016  COUMADIN                 5mg                 9/23/2016   COUMADIN                 5mg                     OTHER CURRENT MEDICATIONS: COUMADIN      PROGRESS NOTES: Same dose rechk 2 weeks    PATIENT INSTRUCTIONS: LMOM    TEST LOCATION: Other - see notes    Electronically signed by: Flakita Lam on 1/13/2017 at 4:22 PM EST

## 2018-01-12 NOTE — RESULT NOTES
Verified Results  (1) PT WITH INR [id number] 07:23AM [place] Order Number: [id number]     Test Name Result Flag Reference   INR 3 24 H 0 86-1 16   PT 32 4 seconds H 12 0-14 3

## 2018-01-12 NOTE — RESULT NOTES
Verified Results  (1) PT WITH INR 12Jan2017 07:56AM Crystal Zamora     Test Name Result Flag Reference   INR 2 77 H 0 86-1 16   PT 28 8 seconds H 12 0-14 3

## 2018-01-12 NOTE — RESULT NOTES
Verified Results  (1) PT WITH INR [id number] 11:12AM Crystal Zamora     Test Name Result Flag Reference   INR 2 94 H 0 86-1 16   PT 31 1 seconds H 12 1-14 4

## 2018-01-13 VITALS
WEIGHT: 203 LBS | BODY MASS INDEX: 31.86 KG/M2 | OXYGEN SATURATION: 97 % | HEART RATE: 74 BPM | DIASTOLIC BLOOD PRESSURE: 62 MMHG | HEIGHT: 67 IN | SYSTOLIC BLOOD PRESSURE: 106 MMHG

## 2018-01-13 NOTE — RESULT NOTES
Verified Results  (1) PT WITH INR [id number] 11:13AM Cassidy Garza Order Number: [id number]     Test Name Result Flag Reference   INR 3 12 H 0 86-1 16   PT 31 5 seconds H 12 0-14 3

## 2018-01-13 NOTE — PROGRESS NOTES
REPORT NAME: Patient Visit Summary Report   VISIT DATE: 2/22/2017  VISIT TIME: 2:19 PM EST  PATIENT NAME: Giovani Brunson  MEDICAL RECORD NUMBER: [id number]  SOCIAL SECURITY NUMBER:    YOB: 1934  AGE: 80  REFERRING PHYSICIAN: Crystal Zamora  SUPERVISING CLINICIAN: Crystal Zamora  HEALTH CARE PROFESSIONAL: Skylar Rossi  PATIENT HOME ADDRESS: [facility] [address]  [address]  PATIENT HOME PHONE:  [phone]  DIAGNOSIS 1: Atrial Fibrillation / 427 31  DIAGNOSIS 2: Long-term (current) use of anticoagulants / V58 61  DIAGNOSIS 3:   DIAGNOSIS 4:   INR RANGE: 2 - 3  INR GOAL: 2 5  TREATMENT START DATE:   TREATMENT END DATE:    NEXT VISIT:       VISIT RESULTS   ENCOUNTER NUMBER:   TEST LOCATION: Other - see notes  TEST TYPE: Outside Lab (Venipuncture)  VISIT TYPE:   CURRENT INR: 3 17 PROTIME:   SPECIMEN COL AND RPT DATE: 2/22/2017 2:19 PM  EST     VITAL SIGNS  PULSE:  B/P:  WEIGHT:  HEIGHT:  TEMP:     CURRENT ANTICOAGULANT DOSING SCHEDULE  DOSE SIZE: 5mg    ANTICOAGULANT TYPE: COUMADIN  DOSING REGIMEN  Sun       Mon Tues Wed Thurs Fri       Sat  Total/Wk   5         5         5         5         5         5         5         35    PATIENT MEDICATION INSTRUCTION: Yes  PATIENT NUTRITIONAL COUNSELING: No  PATIENT BRUISING INSTRUCTION: No      LAST EDUCATION DATE:       PREVIOUS VISIT  INFORMATION  VISITDATE   INR Goal  INR   Sun     Mon Tues Wed Thurs   Fri  Sat     Total/wk  2/22/2017   2 5       3 17  5       5       5       5       5       5  5       35  1/13/2017   2 5       2 77  5       5       5        5       5       5  5       35  12/9/2016   2 5       3 15  5       5       5       5       5       5  5       35  10/31/2016  2 5       3 12  5       5       5       5       5       5  5       35    ADDITIONAL PREVIOUS VISIT INFORMATION   VISITDATE   PRIMARY RX               DOSE      CrCl  2/22/2017   COUMADIN                 5mg 1/13/2017   COUMADIN                 5mg                 12/9/2016   COUMADIN                 5mg                 10/31/2016  COUMADIN                  5mg                     OTHER CURRENT MEDICATIONS: COUMADIN      PROGRESS NOTES: Hold x 1 day then resume  Rechk 2 weeks    PATIENT INSTRUCTIONS: Spoke with pt    TEST LOCATION: Other - see notes    Electronically  signed by: Elen Elias on 2/22/2017 at 2:19 PM EST              Electronically signed by:Crystal CORTES    Feb 22 2017  3:55PM EST

## 2018-01-14 VITALS
HEIGHT: 67 IN | WEIGHT: 204 LBS | DIASTOLIC BLOOD PRESSURE: 67 MMHG | BODY MASS INDEX: 32.02 KG/M2 | HEART RATE: 80 BPM | SYSTOLIC BLOOD PRESSURE: 104 MMHG

## 2018-01-14 NOTE — PROGRESS NOTES
REPORT NAME: Patient Visit Summary Report   VISIT DATE: 8/26/2016  VISIT TIME: 4:11 PM EDT  PATIENT NAME: Jono Branham  MEDICAL RECORD NUMBER: [id number]  SOCIAL SECURITY NUMBER:   YOB: 1934  AGE: 80  REFERRING PHYSICIAN: Crystal Zamora  SUPERVISING CLINICIAN: Crystal Zamora  HEALTH CARE PROFESSIONAL: Fatemeh Guerrero  PATIENT HOME ADDRESS: [facility] [address]  [address]  PATIENT HOME PHONE: [phone]  DIAGNOSIS 1: Atrial Fibrillation / 427 31  DIAGNOSIS 2: Long-term (current) use of anticoagulants / V58 61  DIAGNOSIS 3:   DIAGNOSIS 4:   INR RANGE: 2 - 3  INR GOAL: 2 5  TREATMENT START DATE:   TREATMENT END DATE:   NEXT VISIT:       VISIT RESULTS   ENCOUNTER NUMBER:   TEST LOCATION: Other - see notes  TEST TYPE: Outside Lab (Venipuncture)  VISIT TYPE:   CURRENT INR: 2 65 PROTIME:   SPECIMEN COL AND RPT DATE: 8/26/2016 4:11 PM  EDT    VITAL SIGNS  PULSE:  B/P:  WEIGHT:  HEIGHT:  TEMP:     CURRENT ANTICOAGULANT DOSING SCHEDULE  DOSE SIZE: 5mg    ANTICOAGULANT TYPE: COUMADIN  DOSING REGIMEN  Sun       Mon       Tues      Wed       Thurs     Fri       Sat  Total/Wk  5         5         5         5         5         5         5         35    PATIENT MEDICATION INSTRUCTION: Yes  PATIENT NUTRITIONAL COUNSELING: No  PATIENT BRUISING INSTRUCTION: No      LAST EDUCATION DATE:       PREVIOUS VISIT INFORMATION  VISITDATE   INR Goal  INR   Sun     Mon     Tues    Wed     Thurs   Fri  Sat     Total/wk  8/26/2016   2 5       2 65  5       5       5       5       5       5  5       35  7/13/2016   2 5       2 91  5       5       5       5       5       5  5       35  6/1/2016    2 5       3 17  5       5       5       5       5       5  5       35  4/28/2016   2 5       2 93  5       5       5       5       5       5  5       35    ADDITIONAL PREVIOUS VISIT INFORMATION  VISITDATE   PRIMARY RX               DOSE      CrCl  8/26/2016   COUMADIN                 5mg                 7/13/2016 COUMADIN                 5mg                 6/1/2016    COUMADIN                 5mg                 4/28/2016   COUMADIN                 5mg                     OTHER CURRENT MEDICATIONS: COUMADIN      PROGRESS NOTES: Same Dose rechk 2 weeks    PATIENT INSTRUCTIONS: LMOM    TEST LOCATION: Other - see notes    Electronically signed by: Loren Hernandez on 8/26/2016 at 4:11 PM EDT

## 2018-01-15 ENCOUNTER — APPOINTMENT (OUTPATIENT)
Dept: LAB | Facility: CLINIC | Age: 83
End: 2018-01-15
Payer: MEDICARE

## 2018-01-15 DIAGNOSIS — E55.9 AVITAMINOSIS D: ICD-10-CM

## 2018-01-15 DIAGNOSIS — E11.42 DIABETIC POLYNEUROPATHY ASSOCIATED WITH TYPE 2 DIABETES MELLITUS (HCC): Primary | ICD-10-CM

## 2018-01-15 DIAGNOSIS — I48.0 PAROXYSMAL ATRIAL FIBRILLATION (HCC): ICD-10-CM

## 2018-01-15 DIAGNOSIS — E78.2 MIXED HYPERLIPIDEMIA: ICD-10-CM

## 2018-01-15 LAB
25(OH)D3 SERPL-MCNC: 19.4 NG/ML (ref 30–100)
ALBUMIN SERPL BCP-MCNC: 3.4 G/DL (ref 3.5–5)
ALP SERPL-CCNC: 96 U/L (ref 46–116)
ALT SERPL W P-5'-P-CCNC: 21 U/L (ref 12–78)
ANION GAP SERPL CALCULATED.3IONS-SCNC: 4 MMOL/L (ref 4–13)
AST SERPL W P-5'-P-CCNC: 17 U/L (ref 5–45)
BILIRUB DIRECT SERPL-MCNC: 0.16 MG/DL (ref 0–0.2)
BILIRUB SERPL-MCNC: 0.41 MG/DL (ref 0.2–1)
BUN SERPL-MCNC: 19 MG/DL (ref 5–25)
CALCIUM SERPL-MCNC: 8.9 MG/DL (ref 8.3–10.1)
CHLORIDE SERPL-SCNC: 107 MMOL/L (ref 100–108)
CHOLEST SERPL-MCNC: 97 MG/DL (ref 50–200)
CO2 SERPL-SCNC: 31 MMOL/L (ref 21–32)
CREAT SERPL-MCNC: 1.02 MG/DL (ref 0.6–1.3)
EST. AVERAGE GLUCOSE BLD GHB EST-MCNC: 160 MG/DL
GFR SERPL CREATININE-BSD FRML MDRD: 51 ML/MIN/1.73SQ M
GLUCOSE P FAST SERPL-MCNC: 128 MG/DL (ref 65–99)
HBA1C MFR BLD: 7.2 % (ref 4.2–6.3)
HDLC SERPL-MCNC: 39 MG/DL (ref 40–60)
LDLC SERPL CALC-MCNC: 36 MG/DL (ref 0–100)
MAGNESIUM SERPL-MCNC: 2.1 MG/DL (ref 1.6–2.6)
PHOSPHATE SERPL-MCNC: 2.8 MG/DL (ref 2.3–4.1)
POTASSIUM SERPL-SCNC: 4 MMOL/L (ref 3.5–5.3)
PROT SERPL-MCNC: 7.7 G/DL (ref 6.4–8.2)
SODIUM SERPL-SCNC: 142 MMOL/L (ref 136–145)
T4 FREE SERPL-MCNC: 1.1 NG/DL (ref 0.76–1.46)
TRIGL SERPL-MCNC: 108 MG/DL
TSH SERPL DL<=0.05 MIU/L-ACNC: 3.58 UIU/ML (ref 0.36–3.74)

## 2018-01-15 PROCEDURE — 84100 ASSAY OF PHOSPHORUS: CPT

## 2018-01-15 PROCEDURE — 36415 COLL VENOUS BLD VENIPUNCTURE: CPT

## 2018-01-15 PROCEDURE — 80076 HEPATIC FUNCTION PANEL: CPT

## 2018-01-15 PROCEDURE — 84443 ASSAY THYROID STIM HORMONE: CPT

## 2018-01-15 PROCEDURE — 80061 LIPID PANEL: CPT

## 2018-01-15 PROCEDURE — 83735 ASSAY OF MAGNESIUM: CPT

## 2018-01-15 PROCEDURE — 80048 BASIC METABOLIC PNL TOTAL CA: CPT

## 2018-01-15 PROCEDURE — 84439 ASSAY OF FREE THYROXINE: CPT

## 2018-01-15 PROCEDURE — 83036 HEMOGLOBIN GLYCOSYLATED A1C: CPT

## 2018-01-15 PROCEDURE — 82306 VITAMIN D 25 HYDROXY: CPT

## 2018-01-15 NOTE — PROGRESS NOTES
REPORT NAME: Patient Visit Summary Report   VISIT DATE: 4/8/2016  VISIT TIME: 8:48 AM EDT  PATIENT NAME: Trena Roblero  MEDICAL RECORD NUMBER: [id number]  SOCIAL SECURITY NUMBER:    YOB: 1934  AGE: 80  REFERRING PHYSICIAN: Crystal Zamora  SUPERVISING CLINICIAN: Crystal Zamora  HEALTH CARE PROFESSIONAL: Kale Em   [facility] [address]  [address]  PATIENT HOME  PHONE: [phone]  DIAGNOSIS 1: Atrial Fibrillation / 427 31  DIAGNOSIS 2: Long-term (current) use of anticoagulants / V58 61  DIAGNOSIS 3:   DIAGNOSIS 4:   INR RANGE: 2 - 3  INR GOAL: 2 5  TREATMENT START DATE:   TREATMENT  END DATE:   NEXT VISIT: 4/22/2016  [facility]    VISIT RESULTS   ENCOUNTER NUMBER:   TEST LOCATION: Other - see notes  TEST TYPE: Outside Lab (Venipuncture)  VISIT TYPE:   CURRENT INR: 3 15 PROTIME:   SPECIMEN COL AND RPT DATE: 4/8/2016  8:48 AM  EDT    VITAL SIGNS  PULSE:  B/P:  WEIGHT:  HEIGHT:  TEMP:     CURRENT ANTICOAGULANT DOSING SCHEDULE  DOSE SIZE: 5mg    ANTICOAGULANT TYPE: COUMADIN  DOSING REGIMEN  Sun       Mon       Tues      Wed       Thurs     Fri        Sat  Total/Wk  5         5         5         5         5         5         5         35    PATIENT MEDICATION INSTRUCTION: Yes  PATIENT NUTRITIONAL COUNSELING: No  PATIENT BRUISING INSTRUCTION: No      LAST EDUCATION DATE:        PREVIOUS VISIT INFORMATION  VISITDATE   INR Goal  INR   Sun     Mon     Tues    Wed     Thurs   Fri  Sat     Total/wk  4/8/2016    2 5       3 15  5       5       5       5       5       5  5       35  2/29/2016   2 5       3 1   5        5       5       5       5       5  5       35  1/28/2016   2 5       2 66  5       5       5       5       5       5  5       35  1/8/2016    2 5       1 9   5       5       5       5       5       5  5       35    ADDITIONAL PREVIOUS  VISIT INFORMATION  VISITDATE   PRIMARY RX               DOSE      CrCl  4/8/2016    COUMADIN                 5mg 2/29/2016   COUMADIN                 5mg                 1/28/2016   COUMADIN                 5mg                  1/8/2016    COUMADIN                 5mg                     OTHER CURRENT MEDICATIONS: COUMADIN      PROGRESS NOTES: HOLD COUMADIN 1 DAY  RESUME SAME DOSE  RECHECK 2 WEEKS -  PER     PATIENT INSTRUCTIONS: LMOM ON HOME#  WITH INSTRUCTIONS    TEST LOCATION: Other - see notes    Electronically signed by: Yanely Owens  on 4/8/2016 at 8:48 AM EDT              Electronically signed by:Crystal CORTES    Apr 8 2016 10:02AM EST

## 2018-01-15 NOTE — PROGRESS NOTES
REPORT NAME: Patient Visit Summary Report   VISIT DATE: 9/23/2016  VISIT TIME: 1:58 PM EDT  PATIENT NAME: Rosa Sepulveda  MEDICAL RECORD NUMBER: [id number]  SOCIAL SECURITY NUMBER:    YOB: 1934  AGE: 80  REFERRING PHYSICIAN: Crystal Zamora  SUPERVISING CLINICIAN: Crystal Zamora  HEALTH CARE PROFESSIONAL: Jaswinder Reza  PATIENT [facility] [address]  [address]  PATIENT HOME PHONE:  [phone]  DIAGNOSIS 1: Atrial Fibrillation / 427 31  DIAGNOSIS 2: Long-term (current) use of anticoagulants / V58 61  DIAGNOSIS 3:   DIAGNOSIS 4:   INR RANGE: 2 - 3  INR GOAL: 2 5  TREATMENT START DATE:   TREATMENT END DATE:    NEXT VISIT:       VISIT RESULTS   ENCOUNTER NUMBER:   TEST LOCATION: Other - see notes  TEST TYPE: Outside Lab (Venipuncture)  VISIT TYPE:   CURRENT INR: 3 24 PROTIME:   SPECIMEN COL AND RPT DATE: 9/23/2016 1:58 PM  EDT     VITAL SIGNS  PULSE:  B/P:  WEIGHT:  HEIGHT:  TEMP:     CURRENT ANTICOAGULANT DOSING SCHEDULE  DOSE SIZE: 5mg    ANTICOAGULANT TYPE: COUMADIN  DOSING REGIMEN  Sun       Mon       Tues      Wed       Thurs     Fri       Sat  Total/Wk   5         5         5         5         5         5         5         35    PATIENT MEDICATION INSTRUCTION: Yes  PATIENT NUTRITIONAL COUNSELING: No  PATIENT BRUISING INSTRUCTION: No      LAST EDUCATION DATE:       PREVIOUS VISIT  INFORMATION  VISITDATE   INR Goal  INR   Sun     Mon     Tues    Wed     Thurs   Fri  Sat     Total/wk  9/23/2016   2 5       3 24  5       5       5       5       5       5  5       35  8/26/2016   2 5       2 65  5       5       5        5       5       5  5       35  7/13/2016   2 5       2 91  5       5       5       5       5       5  5       35  6/1/2016    2 5       3 17  5       5       5       5       5       5  5       35    ADDITIONAL PREVIOUS VISIT INFORMATION   VISITDATE   PRIMARY RX               DOSE      CrCl  9/23/2016   COUMADIN                 5mg 8/26/2016   COUMADIN                 5mg                 7/13/2016   COUMADIN                 5mg                 6/1/2016    COUMADIN                  5mg                     OTHER CURRENT MEDICATIONS: COUMADIN      PROGRESS NOTES: Hold x 1 day then resume rechk in 3 weeks    PATIENT INSTRUCTIONS: Spoke with pt    TEST LOCATION: Other - see notes    Electronically  signed by: Kristal Felipe on 9/23/2016 at 1:58 PM EDT              Electronically signed by:Crystal CORTES    Sep 24 2016  9:47AM EST

## 2018-01-15 NOTE — RESULT NOTES
Verified Results  (1) PT WITH INR [id number] 10:34AM Crystal Zamora     Test Name Result Flag Reference   INR 2 83 H 0 86-1 16   PT 30 1 seconds H 12 1-14 4

## 2018-01-16 NOTE — PROGRESS NOTES
REPORT NAME: Patient Visit Summary Report   VISIT DATE: 6/1/2016  VISIT TIME: 4:03 PM EDT  PATIENT NAME: Jenna Hartman  MEDICAL RECORD NUMBER: [id number]  SOCIAL SECURITY NUMBER:    YOB: 1934  AGE: 80  REFERRING PHYSICIAN: Crystal Zamora  SUPERVISING CLINICIAN: Crystal Zamora  HEALTH CARE PROFESSIONAL: Trang Sewell   PATIENT HOME ADDRESS: [facility] [address]  [address]  PATIENT HOME  PHONE: [phone]  DIAGNOSIS 1: Atrial Fibrillation / 427 31  DIAGNOSIS 2: Long-term (current) use of anticoagulants / V58 61  DIAGNOSIS 3:   DIAGNOSIS 4:   INR RANGE: 2 - 3  INR GOAL: 2 5  TREATMENT START DATE:   TREATMENT  END DATE:   NEXT VISIT: 6/22/2016  [facility]    VISIT RESULTS   ENCOUNTER NUMBER:   TEST LOCATION: Other - see notes  TEST TYPE: Outside Lab (Venipuncture)  VISIT TYPE:   CURRENT INR: 3 17 PROTIME:   SPECIMEN COL AND RPT DATE: 6/1/2016  4:03 PM  EDT    VITAL SIGNS  PULSE:  B/P:  WEIGHT:  HEIGHT:  TEMP:     CURRENT ANTICOAGULANT DOSING SCHEDULE  DOSE SIZE: 5mg    ANTICOAGULANT TYPE: COUMADIN  DOSING REGIMEN  Sun       Mon       Tues      Wed       Thurs     Fri        Sat  Total/Wk  5         5         5         5         5         5         5         35    PATIENT MEDICATION INSTRUCTION: Yes  PATIENT NUTRITIONAL COUNSELING: No  PATIENT BRUISING INSTRUCTION: No      LAST EDUCATION DATE:        PREVIOUS VISIT INFORMATION  VISITDATE   INR Goal  INR   Sun     Mon     Tues    Wed     Thurs   Fri  Sat     Total/wk  6/1/2016    2 5       3 17  5       5       5       5       5       5  5       35  4/28/2016   2 5       2 93  5        5       5       5       5       5  5       35  4/8/2016    2 5       3 15  5       5       5       5       5       5  5       35  2/29/2016   2 5       3 1   5       5       5       5       5       5  5       35    ADDITIONAL PREVIOUS  VISIT INFORMATION  VISITDATE   PRIMARY RX               DOSE      CrCl  6/1/2016    COUMADIN                 5mg 4/28/2016   COUMADIN                 5mg                 4/8/2016    COUMADIN                 5mg                  2/29/2016   COUMADIN                 5mg                     OTHER CURRENT MEDICATIONS: COUMADIN      PROGRESS NOTES: same dose  recheck 2 weeks - per AS    PATIENT INSTRUCTIONS: lmom on home# with instructions    TEST LOCATION:  Other - see notes    Electronically signed by: Ana Laura Kathleen  on 6/1/2016 at 4:03 PM EDT              Electronically signed by:Crystal CORTES    Jun 6 2016  4:10PM EST

## 2018-01-16 NOTE — RESULT NOTES
Verified Results  (1) PT WITH INR [id number] 11:28AM Crystal Zamora     Test Name Result Flag Reference   INR 3 15 H 0 86-1 16   PT 31 7 seconds H 12 0-14 3

## 2018-01-16 NOTE — PROGRESS NOTES
REPORT NAME: Progress Notes Report  VISIT DATE: 5/12/2017  VISIT TIME: 4:33 PM EDT  PATIENT NAME: Jono Branham  MEDICAL RECORD NUMBER: [id number]  YOB: 1934  AGE: 80  REFERRING  PHYSICIAN: Crystal Zamora  SUPERVISING CLINICIAN: Alisia Fatima CARE PROVIDER: Ilda Abebe  PATIENT HOME ADDRESS: [address] PHONE: [phone]  SOCIAL SECURITY NUMBER:    DIAGNOSIS 1: Atrial Fibrillation / 427 31  DIAGNOSIS 2: Long-term (current) use of anticoagulants / V58 61  INR RANGE: 2 - 3  INR GOAL: 2 5  TREATMENT START DATE:   TREATMENT END DATE:   NEXT VISIT:     VISIT RESULTS  ENCOUNTER  NUMBER:   TEST LOCATION: Other - see notes  TEST TYPE: Outside Lab (Venipuncture)  VISIT TYPE:   CURRENT INR: 3 05 PROTIME:   SPECIMEN COL AND RPT DATE: 5/12/2017 4:33 PM  EDT  VITAL SIGNS  PULSE:  BP: / WEIGHT:  HEIGHT:  TEMP:    CURRENT ANTICOAGULANT DOSING SCHEDULE  DOSE SIZE: 5mg    ANTICOAGULANT TYPE: COUMADIN  DOSING REGIMEN  Sun       Mon       Tues      Wed       Thurs     Fri       Sat  Total/Wk  5         5         5         5         5         5          5         35  PATIENT MEDICATION INSTRUCTION: Yes  PATIENT NUTRITIONAL COUNSELING: No  PATIENT BRUISING INSTRUCTION: No  LAST EDUCATION DATE:   PREVIOUS VISIT INFORMATION  VISITDATE  INRGoal INR   Sun    Mon    Tues   Wed    Thurs   Fri    Sat  Total/wk  5/12/2017   2 5     3 05  5      5      5      5      5      5      5  35  4/28/2017   2 5     3 03  5      5      5      5      5      5      5  35  3/23/2017   2 5     2 67  5      5      5      5      5       5      5  35  2/22/2017   2 5     3 17  5      5      5      5      5      5      5  35  ADDITIONAL PREVIOUS VISIT INFORMATION  VISITDATE   PRIMARY RX               DOSE      CrCl  5/12/2017   COUMADIN                 5mg  4/28/2017    COUMADIN                 5mg  3/23/2017   COUMADIN                 5mg  2/22/2017   COUMADIN 5mg  OTHER CURRENT MEDICATIONS:  COUMADIN  PROGRESS NOTES: Pt  was told to hold Coumadin one day then resume regular  dose  at 5 mg a day and repeat INR in 2 weeks  PV  PATIENT INSTRUCTIONS: Spoke with patient  TEST LOCATION: Other - see notes, , ,   INBOUND LAB DATA:  Lab       Lab Value Col Date                 Rpt Date                 Lab  Reference Range   Electronically signed by: James Isaac on 5/12/2017 4:33 PM EDT              Electronically signed by:Crystal CORTES    May 12 2017  4:33PM EST

## 2018-01-16 NOTE — RESULT NOTES
Verified Results  (1) PT WITH INR [id number] 01:59PM Ru Alcaraz Order Number: [id number]     Test Name Result Flag Reference   INR 3 17 H 0 86-1 16   PT 31 9 seconds H 12 0-14 3

## 2018-01-16 NOTE — RESULT NOTES
Verified Results  (1) PT WITH INR [id number] 11:06AM Brad Tate Order Number: [id number]     Test Name Result Flag Reference   INR 2 65 H 0 86-1 16   PT 27 8 seconds H 12 0-14 3

## 2018-01-16 NOTE — RESULT NOTES
Verified Results  (1) PT WITH INR [id number] 10:20AM Crystal Zamora     Test Name Result Flag Reference   INR 3 15 H 0 86-1 16   PT 30 6 seconds H 11 8-14 1

## 2018-01-16 NOTE — PROGRESS NOTES
REPORT NAME: Progress Notes Report  VISIT DATE: 9/8/2017  VISIT TIME: 8:09 AM EDT  PATIENT NAME: Carlo Miles  MEDICAL RECORD NUMBER: [id number]  YOB: 1934  AGE: 80  REFERRING PHYSICIAN: Crystal Zamora  SUPERVISING CLINICIAN: Alisia Fatima CARE PROVIDER: Barbara Sidhu  PATIENT HOME ADDRESS: [address]  [address] PHONE: [phone]  SOCIAL SECURITY NUMBER:   DIAGNOSIS 1: Atrial Fibrillation / 427 31  DIAGNOSIS 2: Long-term (current) use of anticoagulants / V58 61  INR RANGE: 2 - 3  INR GOAL: 2 5  TREATMENT START DATE:   TREATMENT END DATE:   NEXT VISIT:     VISIT RESULTS  ENCOUNTER NUMBER:   TEST LOCATION: Other - see notes  TEST TYPE: Outside Lab (Venipuncture)  VISIT TYPE:   CURRENT INR: 2 41 PROTIME:   SPECIMEN COL AND RPT DATE: 9/8/2017 8:09 AM  EDT  VITAL SIGNS  PULSE:  BP: / WEIGHT:  HEIGHT:  TEMP:   CURRENT ANTICOAGULANT DOSING SCHEDULE  DOSE SIZE: 5mg    ANTICOAGULANT TYPE: COUMADIN  DOSING REGIMEN  Sun       Mon Tues Wed Thurs Fri       Sat  Total/Wk  5         5         5         5         5         5         5         35  PATIENT MEDICATION INSTRUCTION: Yes  PATIENT NUTRITIONAL COUNSELING: No  PATIENT BRUISING INSTRUCTION: No  LAST EDUCATION DATE:   PREVIOUS VISIT INFORMATION  VISITDATE  INRGoal INR   Sun    Mon Tues Wed Thurs Fri    Sat  Total/wk  9/8/2017    2 5     2 41  5      5      5      5      5      5      5  35  7/31/2017   2 5     2 8   5      5      5      5      5      5      5  35  5/12/2017   2 5     3 05  5      5      5      5      5      5      5  35  4/28/2017   2 5     3 03  5      5      5      5      5      5      5  35  ADDITIONAL PREVIOUS VISIT INFORMATION  VISITDATE   PRIMARY RX               DOSE      CrCl  9/8/2017    COUMADIN                 5mg  7/31/2017   COUMADIN                 5mg  5/12/2017   COUMADIN                 5mg  4/28/2017   COUMADIN                 5mg  OTHER CURRENT MEDICATIONS:  COUMADIN  PROGRESS NOTES: Same dose rechk 2 weeks  PATIENT INSTRUCTIONS: Spoke with pt  TEST LOCATION: Other - see notes, , ,   INBOUND LAB DATA:  Lab       Lab Value Col Date                 Rpt Date                 Lab  Reference Range  Electronically signed by: Richie Watson on 9/8/2017 8:09 AM EDT

## 2018-01-17 NOTE — RESULT NOTES
Verified Results  (1) PT WITH INR [id number] 10:47AM Crystal Zamora     Test Name Result Flag Reference   INR 2 41 H 0 86-1 16   PT 26 5 seconds H 12 1-14 4

## 2018-01-17 NOTE — PROGRESS NOTES
REPORT NAME: Patient Visit Summary Report   VISIT DATE: 1/28/2016  VISIT TIME: 11:50 AM EST  PATIENT NAME: Peace Sidhu  MEDICAL RECORD NUMBER: [id number]  SOCIAL SECURITY NUMBER:   YOB: 1934  AGE: 80  REFERRING PHYSICIAN: Crystal Zamora  SUPERVISING CLINICIAN: Crystal Zamora  HEALTH CARE PROFESSIONAL: Jemal Nance   [address] PHONE: [phone]  DIAGNOSIS 1: Atrial Fibrillation / 427 31  DIAGNOSIS 2: Long-term (current) use of anticoagulants / V58 61  DIAGNOSIS 3:   DIAGNOSIS 4:   INR RANGE: 2 - 3  INR GOAL: 2 5  TREATMENT START DATE:   TREATMENT END DATE:   NEXT VISIT: 2/18/2016  [facility]    VISIT RESULTS   ENCOUNTER NUMBER:   TEST LOCATION: Other - see notes  TEST TYPE: Outside Lab (Venipuncture)  VISIT TYPE:   CURRENT INR: 2 66 PROTIME:   SPECIMEN COL AND RPT DATE: 1/28/2016 11:50 AM  EST    VITAL SIGNS  PULSE:  B/P:  WEIGHT:  HEIGHT:  TEMP:     CURRENT ANTICOAGULANT DOSING SCHEDULE  DOSE SIZE: 5mg    ANTICOAGULANT TYPE: COUMADIN  DOSING REGIMEN  Sun       Mon Tues Wed Thurs Fri       Sat  Total/Wk  5         5         5         5         5         5         5         35    PATIENT MEDICATION INSTRUCTION: Yes  PATIENT NUTRITIONAL COUNSELING: No  PATIENT BRUISING INSTRUCTION: No      LAST EDUCATION DATE:       PREVIOUS VISIT INFORMATION  VISITDATE   INR Goal  INR   Sun     Mon Tues Wed Thurs Fri  Sat     Total/wk  1/28/2016   2 5       2 66  5       5       5       5       5       5  5       35  1/8/2016    2 5       1 9   5       5       5       5       5       5  5       35  12/3/2015   2 5       3 4   5       5       5       5       5       5  5       35  11/6/2015   2 5       3 6   5       5       5       5       5       5  5       35    ADDITIONAL PREVIOUS VISIT INFORMATION  VISITDATE   PRIMARY RX               DOSE      CrCl  1/28/2016   COUMADIN                 5mg 1/8/2016    COUMADIN                 5mg                 12/3/2015   COUMADIN                 5mg                 11/6/2015   COUMADIN                 5mg                     OTHER CURRENT MEDICATIONS: COUMADIN      PROGRESS NOTES: same dose   recheck 2-3 weeks - per     PATIENT INSTRUCTIONS: lmom on home/cell# with instructions    TEST LOCATION: Other - see notes    Electronically signed by: Walter Healy  on 1/28/2016 at 11:50 AM EST

## 2018-01-17 NOTE — PROGRESS NOTES
REPORT NAME: Patient Visit Summary Report   VISIT DATE: 4/28/2016  VISIT TIME: 11:40 AM EDT  PATIENT NAME: Lily Perkins  MEDICAL RECORD NUMBER: [id number]  SOCIAL SECURITY NUMBER:   YOB: 1934  AGE: 80  REFERRING PHYSICIAN: Crystal Zamora  SUPERVISING CLINICIAN: Crystal Zamora  HEALTH CARE PROFESSIONAL: Martin Calderón   PATIENT HOME ADDRESS: [address] [facility] [address]  [address]  PATIENT HOME PHONE: [phone]  DIAGNOSIS 1: Atrial Fibrillation / 427 31  DIAGNOSIS 2: Long-term (current) use of anticoagulants / V58 61  DIAGNOSIS 3:   DIAGNOSIS 4:   INR RANGE: 2 - 3  INR GOAL: 2 5  TREATMENT START DATE:   TREATMENT END DATE:   NEXT VISIT: 5/19/2016  [facility]    VISIT RESULTS   ENCOUNTER NUMBER:   TEST LOCATION: Other - see notes  TEST TYPE: Outside Lab (Venipuncture)  VISIT TYPE:   CURRENT INR: 2 93 PROTIME:   SPECIMEN COL AND RPT DATE: 4/28/2016 11:40 AM  EDT    VITAL SIGNS  PULSE:  B/P:  WEIGHT:  HEIGHT:  TEMP:     CURRENT ANTICOAGULANT DOSING SCHEDULE  DOSE SIZE: 5mg    ANTICOAGULANT TYPE: COUMADIN  DOSING REGIMEN  Sun       Mon       Tues      Wed       Thurs     Fri       Sat  Total/Wk  5         5         5         5         5         5         5         35    PATIENT MEDICATION INSTRUCTION: Yes  PATIENT NUTRITIONAL COUNSELING: No  PATIENT BRUISING INSTRUCTION: No      LAST EDUCATION DATE:       PREVIOUS VISIT INFORMATION  VISITDATE   INR Goal  INR   Sun     Mon     Tues    Wed     Thurs   Fri  Sat     Total/wk  4/28/2016   2 5       2 93  5       5       5       5       5       5  5       35  4/8/2016    2 5       3 15  5       5       5       5       5       5  5       35  2/29/2016   2 5       3 1   5       5       5       5       5       5  5       35  1/28/2016   2 5       2 66  5       5       5       5       5       5  5       35    ADDITIONAL PREVIOUS VISIT INFORMATION  VISITDATE   PRIMARY RX               DOSE      CrCl  4/28/2016   COUMADIN                 5mg 4/8/2016    COUMADIN                 5mg                 2/29/2016   COUMADIN                 5mg                 1/28/2016   COUMADIN                 5mg                     OTHER CURRENT MEDICATIONS: COUMADIN      PROGRESS NOTES: SAME DOSE   RECHECK 2-3 WEEKS - PER     PATIENT INSTRUCTIONS: LMOM ON HOME# WITH INSTRUCTIONS    TEST LOCATION: Other - see notes    Electronically signed by: Ignacio Ruff  on 4/28/2016 at 11:40 AM EDT

## 2018-01-17 NOTE — RESULT NOTES
Verified Results  (1) PT WITH INR [id number] 11:47AM Crystal Zamora     Test Name Result Flag Reference   INR 2 91 H 0 86-1 16   PT 29 9 seconds H 12 0-14 3

## 2018-01-18 NOTE — PROGRESS NOTES
REPORT NAME: Patient Visit Summary Report   VISIT DATE: 10/31/2016  VISIT TIME: 4:52 PM EDT  PATIENT NAME: Giovani Brunson  MEDICAL RECORD NUMBER: [id number]  SOCIAL SECURITY NUMBER:    YOB: 1934  AGE: 80  REFERRING PHYSICIAN: Crystal Zamora  SUPERVISING CLINICIAN: Crystal Zamora  HEALTH CARE PROFESSIONAL: Skylar Rossi  PATIENT HOME ADDRESS: [facility] [address]  [address]  PATIENT HOME PHONE:  [phone]  DIAGNOSIS 1: Atrial Fibrillation / 427 31  DIAGNOSIS 2: Long-term (current) use of anticoagulants / V58 61  DIAGNOSIS 3:   DIAGNOSIS 4:   INR RANGE: 2 - 3  INR GOAL: 2 5  TREATMENT START DATE:   TREATMENT END DATE:    NEXT VISIT:       VISIT RESULTS   ENCOUNTER NUMBER:   TEST LOCATION: Other - see notes  TEST TYPE: Outside Lab (Venipuncture)  VISIT TYPE:   CURRENT INR: 3 12 PROTIME:   SPECIMEN COL AND RPT DATE: 10/31/2016 4:52 PM  EDT     VITAL SIGNS  PULSE:  B/P:  WEIGHT:  HEIGHT:  TEMP:     CURRENT ANTICOAGULANT DOSING SCHEDULE  DOSE SIZE: 5mg    ANTICOAGULANT TYPE: COUMADIN  DOSING REGIMEN  Sun       Mon       Tues      Wed       Thurs     Fri       Sat  Total/Wk   5         5         5         5         5         5         5         35    PATIENT MEDICATION INSTRUCTION: Yes  PATIENT NUTRITIONAL COUNSELING: No  PATIENT BRUISING INSTRUCTION: No      LAST EDUCATION DATE:       PREVIOUS VISIT  INFORMATION  VISITDATE   INR Goal  INR   Sun     Mon Tues Wed Thurs Fri  Sat     Total/wk  10/31/2016  2 5       3 12  5       5       5       5       5       5  5       35  9/23/2016   2 5       3 24  5       5       5        5       5       5  5       35  8/26/2016   2 5       2 65  5       5       5       5       5       5  5       35  7/13/2016   2 5       2 91  5       5       5       5       5       5  5       35    ADDITIONAL PREVIOUS VISIT INFORMATION   VISITDATE   PRIMARY RX               DOSE      CrCl  10/31/2016  COUMADIN                 5mg 9/23/2016   COUMADIN                 5mg                 8/26/2016   COUMADIN                 5mg                 7/13/2016   COUMADIN                  5mg                     OTHER CURRENT MEDICATIONS: COUMADIN      PROGRESS NOTES: Hold x 1 day then resume    PATIENT INSTRUCTIONS: LMOM    TEST LOCATION: Other - see notes    Electronically signed by: Magali De Anda on 10/31/2016 at 4:52 PM EDT              Electronically signed by:Crystal CORTES    Oct 31 2016  7:43PM EST

## 2018-01-18 NOTE — RESULT NOTES
Verified Results  NM MYOCARDIAL PERFUSION SPECT (RX STRESS AND/OR REST) [id number] 07:38AM Brad Tate Order Number: [id number]    - Patient Instructions: To schedule this appointment, please contact Central Scheduling at [phone]  Test Name Result Flag Reference   NM MYOCARDIAL PERFUSION SPECT (RX STRESS AND/OR REST) (Report)     [address]   [phone]     Rest/Stress Gated SPECT Myocardial Perfusion Imaging After Regadenoson     Patient: Syd Farfan   MR number: [id number]   Account number: [de-identified]   : 1934   Age: 80 years   Gender: Female   Status: Outpatient   Location: [facility]   Height: 67 in   Weight: 203 lb   BP: 130/ 68 mmHg     Allergies: ALLERGIES NOT ON FILE     Diagnosis: I25 10 - Atherosclerotic heart disease of native coronary artery   without angina pectoris, R06 00 - Dyspnea, unspecified     Primary Physician: Magalis Wynn   Referring Physician: David Mares MD   Technician: Nora Osorio BS, BA, AART(N)   Group: [facility]   Other: José Miguel Kelly MS, CCT   Interpreting Physician: David Mares MD     INDICATIONS: CAD, MARTINEZ     HISTORY: The patient is a 80year old  female  Chest pain status: no   chest pain  Other symptoms: decreased effort tolerance  Coronary artery disease   risk factors: dyslipidemia, hypertension, family history of premature coronary   artery disease, and diabetes mellitus  Cardiovascular history: coronary artery   disease  Prior cardiovascular procedures: percutaneous transluminal coronary   angioplasty and pacemaker  Co-morbidity: obesity  Medications: a beta blocker,   an ACE inhibitor/ARB, a diuretic, aspirin, a lipid lowering agent, and diabetic   medications  REST ECG: Atrial fibrillation  Nonspecific ST and T wave abnormalities were   present       PROCEDURE: [place] study was [place] [facility] [place]  A   regadenoson infusion pharmacologic stress test was performed  Gated SPECT   myocardial perfusion imaging was performed after stress and at rest  Systolic   blood pressure was 130 mmHg, at the start of the study  Diastolic blood   pressure was 68 mmHg, at the start of the study  The heart rate was 63 bpm, at   the start of the study  Regadenoson protocol:   HR bpm SBP mmHg DBP mmHg Rhythm/conduct   Baseline 63 130 68 A-fib   Immediate 88 -- -- --   1 min 81 182 78 --   2 min 73 -- -- --   3 min 89 -- -- --   4 min 68 136 70 --   No medications or fluids given  STRESS SUMMARY: Duration of pharmacologic stress was 5 min  Maximal heart rate   during stress was 89 bpm  The rate-pressure product for the peak heart rate and   blood pressure was 83682  There was no chest pain during stress  The stress   test was terminated due to protocol completion  The stress ECG was negative for   ischemia  There were no stress arrhythmias or conduction abnormalities  ISOTOPE ADMINISTRATION:   Resting isotope administration Stress isotope administration   Agent Tetrofosmin Tetrofosmin   Dose 9 88 mCi 31 41 mCi   Date 10/25/2016 10/25/2016     MYOCARDIAL PERFUSION IMAGING:   The image quality was good  Left ventricular size was normal  The TID ratio was   0 90  PERFUSION DEFECTS:   - There were no perfusion defects  GATED SPECT:   Left ventricular ejection fraction was within normal limits by visual estimate  LVEF 65%  There was no left ventricular regional abnormality  SUMMARY:   - Stress results: There was no chest pain during stress  - ECG conclusions: The stress ECG was negative for ischemia  - Perfusion imaging: There were no perfusion defects    - Gated SPECT: Left ventricular ejection fraction was within normal limits by   visual estimate  LVEF 65%  There was no left ventricular regional abnormality  IMPRESSIONS: Normal study   Myocardial perfusion imaging was normal at rest and with stress   Left ventricular systolic function was normal      Prepared and signed by     Sebastian Keller MD   Signed 10/26/2016 13:02:31

## 2018-01-23 NOTE — PROGRESS NOTES
REPORT NAME: Progress Notes Report  VISIT DATE: 12/6/2017  VISIT TIME: 7:24 PM EST  PATIENT NAME: Rima Sampson  MEDICAL RECORD NUMBER: [id number]  YOB: 1934  AGE: 80  REFERRING  PHYSICIAN: Crystal Zamora  SUPERVISING CLINICIAN: Alisia Fatima CARE PROVIDER: Waldo Bartholomew  PATIENT [facility]: [address] [facility],   [address] PHONE: [phone]  SOCIAL SECURITY NUMBER:    DIAGNOSIS 1: Atrial Fibrillation / 427 31  DIAGNOSIS 2: Long-term (current) use of anticoagulants / V58 61  INR RANGE: 2 - 3  INR GOAL: 2 5  TREATMENT START DATE:   TREATMENT END DATE:   NEXT VISIT:     VISIT RESULTS  ENCOUNTER  NUMBER:   TEST LOCATION: Other - see notes  TEST TYPE: Outside Lab (Venipuncture)  VISIT TYPE:   CURRENT INR: 3 03 PROTIME:   SPECIMEN COL AND RPT DATE: 12/6/2017 7:24 PM  EST  VITAL SIGNS  PULSE:  BP: / WEIGHT:  HEIGHT:  TEMP:    CURRENT ANTICOAGULANT DOSING SCHEDULE  DOSE SIZE: 5mg    ANTICOAGULANT TYPE: COUMADIN  DOSING REGIMEN  Sun       Mon       Tues      Wed       Thurs     Fri       Sat  Total/Wk  5         5         5         5         5         5          5         35  PATIENT MEDICATION INSTRUCTION: Yes  PATIENT NUTRITIONAL COUNSELING: No  PATIENT BRUISING INSTRUCTION: No  LAST EDUCATION DATE:   PREVIOUS VISIT INFORMATION  VISITDATE  INRGoal INR   Sun    Mon    Tues   Wed    Thurs   Fri    Sat  Total/wk  12/6/2017   2 5     3 03  5      5      5      5      5      5      5  35  11/3/2017   2 5     2 83  5      5      5      5      5      5      5  35  9/8/2017    2 5     2 41  5      5      5      5      5       5      5  35  7/31/2017   2 5     2 8   5      5      5      5      5      5      5  35  ADDITIONAL PREVIOUS VISIT INFORMATION  VISITDATE   PRIMARY RX               DOSE      CrCl  12/6/2017   COUMADIN                 5mg  11/3/2017    COUMADIN                 5mg  9/8/2017    COUMADIN                 5mg  7/31/2017   COUMADIN 5mg  OTHER CURRENT MEDICATIONS:  COUMADIN  PROGRESS NOTES: Same dose rechk 2 weeks  PATIENT INSTRUCTIONS: LMOM  TEST LOCATION:  Other - see notes, , ,   INBOUND LAB DATA:  Lab       Lab Value Col Date                 Rpt Date                 Lab  Reference Range  Electronically signed by: Shirley Lind on 12/6/2017 7:24 PM EST              Electronically signed by:Crystal CORTES    Dec  6 2017 11:10PM EST

## 2018-01-23 NOTE — RESULT NOTES
Verified Results  (1) PT WITH INR [id number] 11:48AM Crystal Zamora     Test Name Result Flag Reference   INR 3 03 H 0 86-1 16   PT 31 8 seconds H 12 1-14 4

## 2018-02-05 ENCOUNTER — APPOINTMENT (OUTPATIENT)
Dept: LAB | Facility: CLINIC | Age: 83
End: 2018-02-05
Payer: MEDICARE

## 2018-02-05 DIAGNOSIS — I48.91 ATRIAL FIBRILLATION, UNSPECIFIED TYPE (HCC): Primary | ICD-10-CM

## 2018-02-05 LAB
INR PPP: 2.77 (ref 0.86–1.16)
PROTHROMBIN TIME: 29.6 SECONDS (ref 12.1–14.4)

## 2018-02-05 PROCEDURE — 85610 PROTHROMBIN TIME: CPT

## 2018-02-05 PROCEDURE — 36415 COLL VENOUS BLD VENIPUNCTURE: CPT

## 2018-02-06 DIAGNOSIS — I48.91 ATRIAL FIBRILLATION, UNSPECIFIED TYPE (HCC): Primary | ICD-10-CM

## 2018-03-09 ENCOUNTER — ANTICOAG VISIT (OUTPATIENT)
Dept: CARDIOLOGY CLINIC | Facility: CLINIC | Age: 83
End: 2018-03-09

## 2018-03-09 ENCOUNTER — APPOINTMENT (OUTPATIENT)
Dept: LAB | Facility: CLINIC | Age: 83
End: 2018-03-09
Payer: MEDICARE

## 2018-03-09 DIAGNOSIS — I48.91 ATRIAL FIBRILLATION, UNSPECIFIED TYPE (HCC): Primary | ICD-10-CM

## 2018-03-09 PROCEDURE — 36415 COLL VENOUS BLD VENIPUNCTURE: CPT

## 2018-03-09 PROCEDURE — 85610 PROTHROMBIN TIME: CPT

## 2018-04-08 DIAGNOSIS — E87.6 HYPOKALEMIA: Primary | ICD-10-CM

## 2018-04-08 RX ORDER — POTASSIUM CHLORIDE 750 MG/1
TABLET, FILM COATED, EXTENDED RELEASE ORAL
Qty: 180 TABLET | Refills: 3 | Status: SHIPPED | OUTPATIENT
Start: 2018-04-08 | End: 2018-06-04

## 2018-04-11 ENCOUNTER — ANTICOAG VISIT (OUTPATIENT)
Dept: CARDIOLOGY CLINIC | Facility: CLINIC | Age: 83
End: 2018-04-11

## 2018-04-11 ENCOUNTER — APPOINTMENT (OUTPATIENT)
Dept: LAB | Facility: CLINIC | Age: 83
End: 2018-04-11
Payer: MEDICARE

## 2018-04-11 PROCEDURE — 85610 PROTHROMBIN TIME: CPT

## 2018-04-11 PROCEDURE — 36415 COLL VENOUS BLD VENIPUNCTURE: CPT

## 2018-04-11 NOTE — PROGRESS NOTES
Per Dr Brenda Hoang hold 3 days then 2 5/5/5mg recheck 2 weeks, spoke with pt she stated her understanding-MS

## 2018-04-22 DIAGNOSIS — I50.32 CHRONIC DIASTOLIC CONGESTIVE HEART FAILURE (HCC): Primary | ICD-10-CM

## 2018-04-22 RX ORDER — FUROSEMIDE 40 MG/1
TABLET ORAL
Qty: 60 TABLET | Refills: 4 | Status: SHIPPED | OUTPATIENT
Start: 2018-04-22 | End: 2018-09-27 | Stop reason: SDUPTHER

## 2018-05-09 ENCOUNTER — APPOINTMENT (OUTPATIENT)
Dept: LAB | Facility: CLINIC | Age: 83
End: 2018-05-09
Payer: MEDICARE

## 2018-05-10 ENCOUNTER — ANTICOAG VISIT (OUTPATIENT)
Dept: CARDIOLOGY CLINIC | Facility: CLINIC | Age: 83
End: 2018-05-10

## 2018-05-23 RX ORDER — METOPROLOL TARTRATE 50 MG/1
50 TABLET, FILM COATED ORAL EVERY 12 HOURS SCHEDULED
COMMUNITY
Start: 2018-03-23

## 2018-05-23 RX ORDER — BETAMETHASONE DIPROPIONATE 0.5 MG/G
CREAM TOPICAL
COMMUNITY
Start: 2018-02-25

## 2018-05-23 RX ORDER — ENALAPRIL MALEATE 10 MG/1
10 TABLET ORAL DAILY
COMMUNITY
Start: 2018-04-12 | End: 2022-06-22 | Stop reason: ALTCHOICE

## 2018-05-23 RX ORDER — ATORVASTATIN CALCIUM 20 MG/1
20 TABLET, FILM COATED ORAL DAILY
COMMUNITY
Start: 2018-02-22

## 2018-05-23 RX ORDER — DESOXIMETASONE 2.5 MG/G
OINTMENT TOPICAL
COMMUNITY
Start: 2018-03-06 | End: 2022-06-22 | Stop reason: ALTCHOICE

## 2018-05-23 RX ORDER — INSULIN DETEMIR 100 [IU]/ML
INJECTION, SOLUTION SUBCUTANEOUS
COMMUNITY
Start: 2018-04-15 | End: 2019-12-04

## 2018-05-23 RX ORDER — PEN NEEDLE, DIABETIC 31 GX5/16"
NEEDLE, DISPOSABLE MISCELLANEOUS
COMMUNITY
Start: 2014-11-05

## 2018-05-23 RX ORDER — POTASSIUM CHLORIDE 750 MG/1
1 TABLET, EXTENDED RELEASE ORAL 2 TIMES DAILY
COMMUNITY
Start: 2016-02-25 | End: 2018-06-04

## 2018-05-23 RX ORDER — UREA 10 %
LOTION (ML) TOPICAL
COMMUNITY

## 2018-05-23 RX ORDER — WARFARIN SODIUM 5 MG/1
1-2 TABLET ORAL DAILY
COMMUNITY
Start: 2013-12-31 | End: 2019-01-10 | Stop reason: SDUPTHER

## 2018-05-29 ENCOUNTER — APPOINTMENT (OUTPATIENT)
Dept: LAB | Facility: CLINIC | Age: 83
End: 2018-05-29
Payer: MEDICARE

## 2018-05-30 ENCOUNTER — ANTICOAG VISIT (OUTPATIENT)
Dept: CARDIOLOGY CLINIC | Facility: CLINIC | Age: 83
End: 2018-05-30

## 2018-06-04 ENCOUNTER — OFFICE VISIT (OUTPATIENT)
Dept: CARDIOLOGY CLINIC | Facility: CLINIC | Age: 83
End: 2018-06-04
Payer: MEDICARE

## 2018-06-04 VITALS
WEIGHT: 206.6 LBS | HEIGHT: 67 IN | BODY MASS INDEX: 32.43 KG/M2 | DIASTOLIC BLOOD PRESSURE: 72 MMHG | SYSTOLIC BLOOD PRESSURE: 118 MMHG | OXYGEN SATURATION: 97 % | HEART RATE: 74 BPM

## 2018-06-04 DIAGNOSIS — R60.0 LOWER EXTREMITY EDEMA: ICD-10-CM

## 2018-06-04 DIAGNOSIS — Z79.01 CHRONIC ANTICOAGULATION: ICD-10-CM

## 2018-06-04 DIAGNOSIS — E78.2 HYPERLIPEMIA, MIXED: ICD-10-CM

## 2018-06-04 DIAGNOSIS — I48.20 CHRONIC ATRIAL FIBRILLATION (HCC): Primary | ICD-10-CM

## 2018-06-04 PROCEDURE — 99214 OFFICE O/P EST MOD 30 MIN: CPT | Performed by: INTERNAL MEDICINE

## 2018-06-04 NOTE — PROGRESS NOTES
[facility]  [address] [facility]  [phone]  Cardiology Follow Up    Dawna Ley [facility]  1934  [id number]      1  Chronic atrial fibrillation ([facility] )     2  Chronic anticoagulation     3  Hyperlipemia, mixed     4  Lower extremity edema         Chief Complaint   Patient presents with    Follow-up       Interval History:  Patient presents for follow-up visit  Patient denies any history of chest pain  Patient does have some shortness of breath which is stable  Patient also has chronic lower extremity edema  Patient is on anticoagulation with Coumadin  She denies any bleeding issues  She states that she has been compliant with all her present medications  Patient states that she has been watching her salt restriction as well as diet  Patient is regularly followed by the pacemaker clinic    Patient Active Problem List   Diagnosis    Chronic atrial fibrillation ([facility] )    Chronic anticoagulation    Hyperlipemia, mixed    Lower extremity edema     Past Medical History:   Diagnosis Date    Atrial fibrillation ([facility] )     Chest pain     [facility]     Diabetes mellitus [facility]     Difficulty breathing     HTN (hypertension)     Hyperlipidemia     Sinoatrial node dysfunction (HCC)     SOB (shortness of breath)      Social History     Social History    Marital status:      Spouse name: N/A    Number of children: N/A    Years of education: N/A     Occupational History    Not on file       Social History Main Topics    Smoking status: Never Smoker    Smokeless tobacco: Never Used    Alcohol use No    Drug use: Unknown    Sexual activity: Not on file     Other Topics Concern    Not on file     Social History Narrative    No narrative on file      Family History   Problem Relation Age of Onset    Coronary artery disease Mother     Coronary artery disease Father     Hypertension Father      Past Surgical History:   Procedure Laterality Date    ANGIOPLASTY      APPENDECTOMY      CARDIAC CATHETERIZATION      CATARACT EXTRACTION      CHOLECYSTECTOMY      CORONARY ANGIOPLASTY      Pamila Daughters / Prentis [place] / Carol Greer PACEMAKER      TUBAL LIGATION         Current Outpatient Prescriptions:     aspirin 81 MG tablet, Take 1 tablet by mouth daily, Disp: , Rfl:     atorvastatin (LIPITOR) 20 mg tablet, , Disp: , Rfl:     Cholecalciferol (VITAMIN D-3) 5000 units TABS, Take by mouth daily, Disp: , Rfl:     enalapril (VASOTEC) 10 mg tablet, Take 10 mg by mouth daily  , Disp: , Rfl:     furosemide (LASIX) 40 mg tablet, TAKE 1 TABLET TWICE DAILY  , Disp: 60 tablet, Rfl: 4    insulin detemir (LEVEMIR FLEXPEN) subcutaneous injection pen 100 units/mL, Inject 27 Units under the skin daily at bedtime  , Disp: , Rfl:     insulin lispro (HUMALOG KWIKPEN) 100 Units/mL SOPN, Inject under the skin 3 (three) times a day with meals 9 units am, 11 units lunch, 14 units pm , Disp: , Rfl:     Insulin Pen Needle (B-D UF III MINI PEN NEEDLES) 31G X 5 MM MISC, by Does not apply route, Disp: , Rfl:     Insulin Pen Needle (B-D ULTRAFINE III SHORT PEN) 31G X 8 MM MISC, by Does not apply route, Disp: , Rfl:     LEVEMIR FLEXTOUCH subcutaneous injection pen 100 units/mL, , Disp: , Rfl:     metoprolol tartrate (LOPRESSOR) 50 mg tablet, Take 50 mg by mouth daily  , Disp: , Rfl:     ONE TOUCH ULTRA TEST test strip, , Disp: , Rfl:     Potassium Chloride (KLOR-CON 10 PO), Take 1 tablet by mouth 2 (two) times a day, Disp: , Rfl:     vitamin B-12 (CYANOCOBALAMIN) 100 MCG tablet, Take by mouth, Disp: , Rfl:     warfarin (COUMADIN) 5 mg tablet, Take 1-2 tablets by mouth daily, Disp: , Rfl:     betamethasone, augmented, (DIPROLENE-AF) 0 05 % cream, , Disp: , Rfl:     desoximetasone (TOPICORT) 0 25 % ointment, , Disp: , Rfl:     mupirocin (BACTROBAN) 2 % ointment, , Disp: , Rfl:     saxagliptin (ONGLYZA) 2 5 MG tablet, Take by mouth daily, Disp: , Rfl:   Allergies Allergen Reactions    Anacin  [Aspirin-Caffeine]     Neosporin  [Neomycin-Bacitracin Zn-Polymyx]        Labs: Ancillary Orders on 05/25/2018   Component Date Value    Protime 05/29/2018 20 8*    INR 05/29/2018 1 78*   Ancillary Orders on 04/27/2018   Component Date Value    Protime 05/09/2018 25 0*    INR 05/09/2018 2 24*     Imaging: No results found  Review of Systems:  Review of Systems   REVIEW OF SYSTEMS:  Constitutional:  Denies fever or chills   Eyes:  Denies change in visual acuity   HENT:  Denies nasal congestion or sore throat   Respiratory:  Denies cough or shortness of breath   Cardiovascular:   edema   GI:  Denies abdominal pain, nausea, vomiting, bloody stools or diarrhea   :  Denies dysuria, frequency, difficulty in micturition and nocturia  Musculoskeletal:  Denies back pain or joint pain   Neurologic:  Denies headache, focal weakness or sensory changes   Endocrine:  Denies polyuria or polydipsia   Lymphatic:  Denies swollen glands   Psychiatric:  Denies depression or anxiety     Physical Exam:    /72   Pulse 74   Ht 5' 7" (1 702 m)   Wt 93 7 kg (206 lb 9 6 oz)   SpO2 97%   BMI 32 36 kg/m²     Physical Exam   PHYSICAL EXAM:  General:  Patient is not in acute distress   Head: Normocephalic, Atraumatic  HEENT:  Both pupils normal-size atraumatic, normocephalic, nonicteric  Neck:  JVP not raised  Trachea central  No carotid bruit  Respiratory:  normal breath sounds no crackles  no rhonchi  Cardiovascular:  Regular rate and rhythm no S3 no murmurs  GI:  Abdomen soft nontender  No organomegaly  Lymphatic:  No cervical or inguinal lymphadenopathy  Neurologic:  Patient is awake alert, oriented   Grossly nonfocal  Extremities reveal 1+ edema    Discussion/Summary:  Patient with multiple medical problems who seems to be doing reasonably well from cardiac standpoint  Previous studies reviewed with patient  Medications reviewed and possible side effects discussed   concepts of cardiovascular disease , signs and symptoms of heart disease  Dietary and risk factor modification reinforced  All questions answered  Safety measures reviewed  Patient advised to report any problems prompting medical attention  Symptoms to watch out from cardiac standpoint discussed at length with patient and family  Importance of salt restriction reinforced  Patient will continue to follow up with pacemaker clinic  [address]  Patient understands the risks and benefits of anticoagulation to prevent thromboembolic risk from atrial fibrillation  Target INR 2 to 3  Her anticoagulation is followed by primary care physician  Followup in 6 months  Follow-up with primary care physician

## 2018-06-29 ENCOUNTER — APPOINTMENT (OUTPATIENT)
Dept: LAB | Facility: CLINIC | Age: 83
End: 2018-06-29
Payer: MEDICARE

## 2018-06-29 DIAGNOSIS — I10 ESSENTIAL HYPERTENSION, MALIGNANT: ICD-10-CM

## 2018-06-29 DIAGNOSIS — E55.9 AVITAMINOSIS D: ICD-10-CM

## 2018-06-29 DIAGNOSIS — N18.30 CHRONIC KIDNEY DISEASE, STAGE III (MODERATE) (HCC): ICD-10-CM

## 2018-06-29 DIAGNOSIS — E11.8 UNCONTROLLED TYPE 2 DIABETES MELLITUS WITH COMPLICATION, UNSPECIFIED WHETHER LONG TERM INSULIN USE: Primary | ICD-10-CM

## 2018-06-29 DIAGNOSIS — E11.42 DIABETIC POLYNEUROPATHY ASSOCIATED WITH TYPE 2 DIABETES MELLITUS (HCC): ICD-10-CM

## 2018-06-29 DIAGNOSIS — E11.65 UNCONTROLLED TYPE 2 DIABETES MELLITUS WITH COMPLICATION, UNSPECIFIED WHETHER LONG TERM INSULIN USE: Primary | ICD-10-CM

## 2018-06-29 DIAGNOSIS — I25.119 ATHEROSCLEROSIS OF NATIVE CORONARY ARTERY WITH ANGINA PECTORIS, UNSPECIFIED WHETHER NATIVE OR TRANSPLANTED HEART (HCC): ICD-10-CM

## 2018-06-29 LAB
25(OH)D3 SERPL-MCNC: 44.1 NG/ML (ref 30–100)
ALBUMIN SERPL BCP-MCNC: 3.2 G/DL (ref 3.5–5)
ALP SERPL-CCNC: 91 U/L (ref 46–116)
ALT SERPL W P-5'-P-CCNC: 22 U/L (ref 12–78)
ANION GAP SERPL CALCULATED.3IONS-SCNC: 7 MMOL/L (ref 4–13)
AST SERPL W P-5'-P-CCNC: 22 U/L (ref 5–45)
BASOPHILS # BLD AUTO: 0.05 THOUSANDS/ΜL (ref 0–0.1)
BASOPHILS NFR BLD AUTO: 1 % (ref 0–1)
BILIRUB DIRECT SERPL-MCNC: 0.14 MG/DL (ref 0–0.2)
BILIRUB SERPL-MCNC: 0.5 MG/DL (ref 0.2–1)
BUN SERPL-MCNC: 23 MG/DL (ref 5–25)
CALCIUM SERPL-MCNC: 8.7 MG/DL (ref 8.3–10.1)
CHLORIDE SERPL-SCNC: 105 MMOL/L (ref 100–108)
CO2 SERPL-SCNC: 28 MMOL/L (ref 21–32)
CREAT SERPL-MCNC: 1.18 MG/DL (ref 0.6–1.3)
CREAT UR-MCNC: 45.2 MG/DL
EOSINOPHIL # BLD AUTO: 0.18 THOUSAND/ΜL (ref 0–0.61)
EOSINOPHIL NFR BLD AUTO: 3 % (ref 0–6)
ERYTHROCYTE [DISTWIDTH] IN BLOOD BY AUTOMATED COUNT: 15.2 % (ref 11.6–15.1)
EST. AVERAGE GLUCOSE BLD GHB EST-MCNC: 163 MG/DL
GFR SERPL CREATININE-BSD FRML MDRD: 42 ML/MIN/1.73SQ M
GLUCOSE P FAST SERPL-MCNC: 119 MG/DL (ref 65–99)
HBA1C MFR BLD: 7.3 % (ref 4.2–6.3)
HCT VFR BLD AUTO: 36.5 % (ref 34.8–46.1)
HGB BLD-MCNC: 11.4 G/DL (ref 11.5–15.4)
IMM GRANULOCYTES # BLD AUTO: 0.01 THOUSAND/UL (ref 0–0.2)
IMM GRANULOCYTES NFR BLD AUTO: 0 % (ref 0–2)
LYMPHOCYTES # BLD AUTO: 1.47 THOUSANDS/ΜL (ref 0.6–4.47)
LYMPHOCYTES NFR BLD AUTO: 22 % (ref 14–44)
MAGNESIUM SERPL-MCNC: 2.3 MG/DL (ref 1.6–2.6)
MCH RBC QN AUTO: 28.8 PG (ref 26.8–34.3)
MCHC RBC AUTO-ENTMCNC: 31.2 G/DL (ref 31.4–37.4)
MCV RBC AUTO: 92 FL (ref 82–98)
MICROALBUMIN UR-MCNC: <5 MG/L (ref 0–20)
MICROALBUMIN/CREAT 24H UR: <11 MG/G CREATININE (ref 0–30)
MONOCYTES # BLD AUTO: 0.53 THOUSAND/ΜL (ref 0.17–1.22)
MONOCYTES NFR BLD AUTO: 8 % (ref 4–12)
NEUTROPHILS # BLD AUTO: 4.48 THOUSANDS/ΜL (ref 1.85–7.62)
NEUTS SEG NFR BLD AUTO: 66 % (ref 43–75)
NRBC BLD AUTO-RTO: 0 /100 WBCS
PHOSPHATE SERPL-MCNC: 3 MG/DL (ref 2.3–4.1)
PLATELET # BLD AUTO: 162 THOUSANDS/UL (ref 149–390)
PMV BLD AUTO: 13.5 FL (ref 8.9–12.7)
POTASSIUM SERPL-SCNC: 3.9 MMOL/L (ref 3.5–5.3)
PROT SERPL-MCNC: 7.3 G/DL (ref 6.4–8.2)
PTH-INTACT SERPL-MCNC: 72.8 PG/ML (ref 18.4–80.1)
RBC # BLD AUTO: 3.96 MILLION/UL (ref 3.81–5.12)
SODIUM SERPL-SCNC: 140 MMOL/L (ref 136–145)
T4 FREE SERPL-MCNC: 1.02 NG/DL (ref 0.76–1.46)
TSH SERPL DL<=0.05 MIU/L-ACNC: 3.61 UIU/ML (ref 0.36–3.74)
WBC # BLD AUTO: 6.72 THOUSAND/UL (ref 4.31–10.16)

## 2018-06-29 PROCEDURE — 84439 ASSAY OF FREE THYROXINE: CPT

## 2018-06-29 PROCEDURE — 84100 ASSAY OF PHOSPHORUS: CPT

## 2018-06-29 PROCEDURE — 84443 ASSAY THYROID STIM HORMONE: CPT

## 2018-06-29 PROCEDURE — 82043 UR ALBUMIN QUANTITATIVE: CPT

## 2018-06-29 PROCEDURE — 83970 ASSAY OF PARATHORMONE: CPT

## 2018-06-29 PROCEDURE — 36415 COLL VENOUS BLD VENIPUNCTURE: CPT

## 2018-06-29 PROCEDURE — 80076 HEPATIC FUNCTION PANEL: CPT

## 2018-06-29 PROCEDURE — 82306 VITAMIN D 25 HYDROXY: CPT

## 2018-06-29 PROCEDURE — 83036 HEMOGLOBIN GLYCOSYLATED A1C: CPT

## 2018-06-29 PROCEDURE — 80048 BASIC METABOLIC PNL TOTAL CA: CPT

## 2018-06-29 PROCEDURE — 82570 ASSAY OF URINE CREATININE: CPT

## 2018-06-29 PROCEDURE — 83735 ASSAY OF MAGNESIUM: CPT

## 2018-06-29 PROCEDURE — 85025 COMPLETE CBC W/AUTO DIFF WBC: CPT

## 2018-07-06 ENCOUNTER — TELEPHONE (OUTPATIENT)
Dept: CARDIOLOGY CLINIC | Facility: CLINIC | Age: 83
End: 2018-07-06

## 2018-07-06 NOTE — TELEPHONE ENCOUNTER
Spoke with patient she said she had a 6 month physical with a nurse practitioner at [facility] who ordered the EKG after listening to her heart  Patient said she had BW done last weekend and wants to know results of that

## 2018-07-13 ENCOUNTER — REMOTE DEVICE CLINIC VISIT (OUTPATIENT)
Dept: CARDIOLOGY CLINIC | Facility: CLINIC | Age: 83
End: 2018-07-13
Payer: MEDICARE

## 2018-07-13 DIAGNOSIS — Z95.0 CARDIAC PACEMAKER IN SITU: Primary | ICD-10-CM

## 2018-07-13 DIAGNOSIS — I49.5 SINOATRIAL NODE DYSFUNCTION (HCC): ICD-10-CM

## 2018-07-13 PROCEDURE — 93296 REM INTERROG EVL PM/IDS: CPT | Performed by: INTERNAL MEDICINE

## 2018-07-13 PROCEDURE — 93294 REM INTERROG EVL PM/LDLS PM: CPT | Performed by: INTERNAL MEDICINE

## 2018-08-08 ENCOUNTER — ANTICOAG VISIT (OUTPATIENT)
Dept: CARDIOLOGY CLINIC | Facility: CLINIC | Age: 83
End: 2018-08-08

## 2018-08-08 ENCOUNTER — APPOINTMENT (OUTPATIENT)
Dept: LAB | Facility: CLINIC | Age: 83
End: 2018-08-08
Payer: MEDICARE

## 2018-08-08 ENCOUNTER — TELEPHONE (OUTPATIENT)
Dept: CARDIOLOGY CLINIC | Facility: CLINIC | Age: 83
End: 2018-08-08

## 2018-08-08 NOTE — PROGRESS NOTES
Per VP6 - Same dose, recheck 2 weeks     Called pt and no answer   Message was left with instructions

## 2018-09-10 ENCOUNTER — APPOINTMENT (OUTPATIENT)
Dept: LAB | Facility: CLINIC | Age: 83
End: 2018-09-10
Payer: MEDICARE

## 2018-09-10 ENCOUNTER — TRANSCRIBE ORDERS (OUTPATIENT)
Dept: LAB | Facility: CLINIC | Age: 83
End: 2018-09-10

## 2018-09-11 ENCOUNTER — ANTICOAG VISIT (OUTPATIENT)
Dept: CARDIOLOGY CLINIC | Facility: CLINIC | Age: 83
End: 2018-09-11

## 2018-09-27 DIAGNOSIS — I50.32 CHRONIC DIASTOLIC CONGESTIVE HEART FAILURE (HCC): ICD-10-CM

## 2018-09-27 RX ORDER — FUROSEMIDE 40 MG/1
TABLET ORAL
Qty: 60 TABLET | Refills: 4 | Status: SHIPPED | OUTPATIENT
Start: 2018-09-27 | End: 2019-09-03 | Stop reason: SDUPTHER

## 2018-09-29 DIAGNOSIS — I50.32 CHRONIC DIASTOLIC CONGESTIVE HEART FAILURE (HCC): ICD-10-CM

## 2018-09-30 RX ORDER — FUROSEMIDE 40 MG/1
TABLET ORAL
Qty: 60 TABLET | Refills: 4 | Status: SHIPPED | OUTPATIENT
Start: 2018-09-30 | End: 2018-11-21

## 2018-10-01 ENCOUNTER — TELEPHONE (OUTPATIENT)
Dept: CARDIOLOGY CLINIC | Facility: CLINIC | Age: 83
End: 2018-10-01

## 2018-10-01 DIAGNOSIS — I50.32 CHRONIC DIASTOLIC CONGESTIVE HEART FAILURE (HCC): ICD-10-CM

## 2018-10-01 NOTE — TELEPHONE ENCOUNTER
Gave verbal order, [facility] states that they did not have 40mg tablets, gave order for 20mg tablets take 2 tablets BID

## 2018-10-01 NOTE — TELEPHONE ENCOUNTER
PT SAID HER PHARMACY TOLD HER THEY DIDN'T RECEIVE SCRIPT FOR REFILL ON FUROSEMIDE 40MG   [address]

## 2018-10-03 ENCOUNTER — TRANSCRIBE ORDERS (OUTPATIENT)
Dept: ADMINISTRATIVE | Facility: HOSPITAL | Age: 83
End: 2018-10-03

## 2018-10-03 ENCOUNTER — ANTICOAG VISIT (OUTPATIENT)
Dept: CARDIOLOGY CLINIC | Facility: CLINIC | Age: 83
End: 2018-10-03

## 2018-10-03 ENCOUNTER — APPOINTMENT (OUTPATIENT)
Dept: LAB | Facility: CLINIC | Age: 83
End: 2018-10-03
Payer: MEDICARE

## 2018-10-03 DIAGNOSIS — N18.30 CHRONIC KIDNEY DISEASE, STAGE III (MODERATE) (HCC): ICD-10-CM

## 2018-10-03 DIAGNOSIS — I11.0 HYPERTENSIVE HEART FAILURE (HCC): Primary | ICD-10-CM

## 2018-10-03 DIAGNOSIS — E11.00 TYPE II DIABETES MELLITUS WITH HYPEROSMOLARITY, UNCONTROLLED (HCC): ICD-10-CM

## 2018-10-03 DIAGNOSIS — I48.0 PAROXYSMAL ATRIAL FIBRILLATION (HCC): ICD-10-CM

## 2018-10-03 DIAGNOSIS — E55.9 AVITAMINOSIS D: ICD-10-CM

## 2018-10-03 DIAGNOSIS — I48.91 ATRIAL FIBRILLATION, UNSPECIFIED TYPE (HCC): ICD-10-CM

## 2018-10-03 DIAGNOSIS — I11.0 HYPERTENSIVE HEART FAILURE (HCC): ICD-10-CM

## 2018-10-03 DIAGNOSIS — E11.65 TYPE II DIABETES MELLITUS WITH HYPEROSMOLARITY, UNCONTROLLED (HCC): ICD-10-CM

## 2018-10-03 LAB
25(OH)D3 SERPL-MCNC: 51.1 NG/ML (ref 30–100)
ALBUMIN SERPL BCP-MCNC: 3.1 G/DL (ref 3.5–5)
ALP SERPL-CCNC: 88 U/L (ref 46–116)
ALT SERPL W P-5'-P-CCNC: 22 U/L (ref 12–78)
ANION GAP SERPL CALCULATED.3IONS-SCNC: 4 MMOL/L (ref 4–13)
AST SERPL W P-5'-P-CCNC: 17 U/L (ref 5–45)
BACTERIA UR QL AUTO: ABNORMAL /HPF
BILIRUB DIRECT SERPL-MCNC: 0.19 MG/DL (ref 0–0.2)
BILIRUB SERPL-MCNC: 0.7 MG/DL (ref 0.2–1)
BILIRUB UR QL STRIP: NEGATIVE
BUN SERPL-MCNC: 22 MG/DL (ref 5–25)
CALCIUM SERPL-MCNC: 8.8 MG/DL (ref 8.3–10.1)
CHLORIDE SERPL-SCNC: 102 MMOL/L (ref 100–108)
CLARITY UR: ABNORMAL
CO2 SERPL-SCNC: 29 MMOL/L (ref 21–32)
COLOR UR: YELLOW
CREAT SERPL-MCNC: 1.22 MG/DL (ref 0.6–1.3)
CREAT UR-MCNC: 64.4 MG/DL
EST. AVERAGE GLUCOSE BLD GHB EST-MCNC: 151 MG/DL
GFR SERPL CREATININE-BSD FRML MDRD: 41 ML/MIN/1.73SQ M
GLUCOSE SERPL-MCNC: 87 MG/DL (ref 65–140)
GLUCOSE UR STRIP-MCNC: NEGATIVE MG/DL
HBA1C MFR BLD: 6.9 % (ref 4.2–6.3)
HGB UR QL STRIP.AUTO: NEGATIVE
INR PPP: 2.2 (ref 0.86–1.17)
KETONES UR STRIP-MCNC: NEGATIVE MG/DL
LEUKOCYTE ESTERASE UR QL STRIP: NEGATIVE
MAGNESIUM SERPL-MCNC: 2.3 MG/DL (ref 1.6–2.6)
MICROALBUMIN UR-MCNC: 9.9 MG/L (ref 0–20)
MICROALBUMIN/CREAT 24H UR: 15 MG/G CREATININE (ref 0–30)
NITRITE UR QL STRIP: NEGATIVE
NON-SQ EPI CELLS URNS QL MICRO: ABNORMAL /HPF
PH UR STRIP.AUTO: 6.5 [PH] (ref 4.5–8)
PHOSPHATE SERPL-MCNC: 3.2 MG/DL (ref 2.3–4.1)
POTASSIUM SERPL-SCNC: 4 MMOL/L (ref 3.5–5.3)
PROT SERPL-MCNC: 7.8 G/DL (ref 6.4–8.2)
PROT UR STRIP-MCNC: ABNORMAL MG/DL
PROTHROMBIN TIME: 24.5 SECONDS (ref 11.8–14.2)
RBC #/AREA URNS AUTO: ABNORMAL /HPF
SODIUM SERPL-SCNC: 135 MMOL/L (ref 136–145)
SP GR UR STRIP.AUTO: 1.01 (ref 1–1.03)
T4 FREE SERPL-MCNC: 1.15 NG/DL (ref 0.76–1.46)
TSH SERPL DL<=0.05 MIU/L-ACNC: 2.73 UIU/ML (ref 0.36–3.74)
UROBILINOGEN UR QL STRIP.AUTO: 0.2 E.U./DL
WBC #/AREA URNS AUTO: ABNORMAL /HPF

## 2018-10-03 PROCEDURE — 83036 HEMOGLOBIN GLYCOSYLATED A1C: CPT

## 2018-10-03 PROCEDURE — 83735 ASSAY OF MAGNESIUM: CPT

## 2018-10-03 PROCEDURE — 80048 BASIC METABOLIC PNL TOTAL CA: CPT

## 2018-10-03 PROCEDURE — 82570 ASSAY OF URINE CREATININE: CPT

## 2018-10-03 PROCEDURE — 82306 VITAMIN D 25 HYDROXY: CPT

## 2018-10-03 PROCEDURE — 84100 ASSAY OF PHOSPHORUS: CPT

## 2018-10-03 PROCEDURE — 84443 ASSAY THYROID STIM HORMONE: CPT

## 2018-10-03 PROCEDURE — 82043 UR ALBUMIN QUANTITATIVE: CPT

## 2018-10-03 PROCEDURE — 80076 HEPATIC FUNCTION PANEL: CPT

## 2018-10-03 PROCEDURE — 36415 COLL VENOUS BLD VENIPUNCTURE: CPT

## 2018-10-03 PROCEDURE — 85610 PROTHROMBIN TIME: CPT

## 2018-10-03 PROCEDURE — 81001 URINALYSIS AUTO W/SCOPE: CPT | Performed by: INTERNAL MEDICINE

## 2018-10-03 PROCEDURE — 84439 ASSAY OF FREE THYROXINE: CPT

## 2018-10-26 ENCOUNTER — IN-CLINIC DEVICE VISIT (OUTPATIENT)
Dept: CARDIOLOGY CLINIC | Facility: CLINIC | Age: 83
End: 2018-10-26
Payer: MEDICARE

## 2018-10-26 DIAGNOSIS — I49.5 SINOATRIAL NODE DYSFUNCTION (HCC): ICD-10-CM

## 2018-10-26 DIAGNOSIS — Z95.0 PACEMAKER: ICD-10-CM

## 2018-10-26 PROCEDURE — 93279 PRGRMG DEV EVAL PM/LDLS PM: CPT | Performed by: INTERNAL MEDICINE

## 2018-11-21 ENCOUNTER — OFFICE VISIT (OUTPATIENT)
Dept: CARDIOLOGY CLINIC | Facility: CLINIC | Age: 83
End: 2018-11-21
Payer: MEDICARE

## 2018-11-21 VITALS
WEIGHT: 201.2 LBS | BODY MASS INDEX: 31.58 KG/M2 | HEIGHT: 67 IN | DIASTOLIC BLOOD PRESSURE: 82 MMHG | OXYGEN SATURATION: 97 % | HEART RATE: 62 BPM | SYSTOLIC BLOOD PRESSURE: 136 MMHG

## 2018-11-21 DIAGNOSIS — I48.20 CHRONIC ATRIAL FIBRILLATION (HCC): Primary | ICD-10-CM

## 2018-11-21 DIAGNOSIS — R60.0 LOWER EXTREMITY EDEMA: ICD-10-CM

## 2018-11-21 DIAGNOSIS — Z79.01 CHRONIC ANTICOAGULATION: ICD-10-CM

## 2018-11-21 DIAGNOSIS — I48.91 ATRIAL FIBRILLATION, UNSPECIFIED TYPE (HCC): Primary | ICD-10-CM

## 2018-11-21 DIAGNOSIS — E78.2 HYPERLIPEMIA, MIXED: ICD-10-CM

## 2018-11-21 PROCEDURE — 99214 OFFICE O/P EST MOD 30 MIN: CPT | Performed by: INTERNAL MEDICINE

## 2018-11-21 NOTE — PROGRESS NOTES
[facility] [place] [facility]  [address]  [facility]  [address]  Cardiology Follow Up    Patricia Hayes [facility]  1934  [id number]      1  Chronic atrial fibrillation ([facility] [facility] )     2  Chronic anticoagulation     3  Hyperlipemia, mixed     4  Lower extremity edema         Chief Complaint   Patient presents with    Follow-up       Interval History:  Patient presents for follow-up visit  Patient denies any chest pain  No shortness of breath out of the ordinary  Patient does have some leg edema  Patient admits to not being very compliant with salt restriction  No bleeding issues  Patient is on warfarin anticoagulation for atrial fibrillation  She states that she has been compliant however with all her present medications  Patient Active Problem List   Diagnosis    Chronic atrial fibrillation (HCC)    Chronic anticoagulation    Hyperlipemia, mixed    Lower extremity edema     Past Medical History:   Diagnosis Date    Atrial fibrillation ([facility] )     Chest pain     [facility]     Diabetes mellitus ([facility] )     Difficulty breathing     HTN (hypertension)     Hyperlipidemia     Sinoatrial node dysfunction (HCC)     SOB (shortness of breath)      Social History     Social History    Marital status:      Spouse name: N/A    Number of children: N/A    Years of education: N/A     Occupational History    Not on file       Social History Main Topics    Smoking status: Never Smoker    Smokeless tobacco: Never Used    Alcohol use No    Drug use: Unknown    Sexual activity: Not on file     Other Topics Concern    Not on file     Social History Narrative    No narrative on file      Family History   Problem Relation Age of Onset    Coronary artery disease Mother     Coronary artery disease Father     Hypertension Father      Past Surgical History:   Procedure Laterality Date    ANGIOPLASTY      APPENDECTOMY      CARDIAC CATHETERIZATION      CATARACT EXTRACTION      CHOLECYSTECTOMY      CORONARY ANGIOPLASTY      INSERT / REPLACE / REMOVE PACEMAKER      TUBAL LIGATION         Current Outpatient Prescriptions:     aspirin 81 MG tablet, Take 1 tablet by mouth daily, Disp: , Rfl:     atorvastatin (LIPITOR) 20 mg tablet, 20 mg daily  , Disp: , Rfl:     enalapril (VASOTEC) 10 mg tablet, Take 10 mg by mouth daily  , Disp: , Rfl:     furosemide (LASIX) 40 mg tablet, take 1 tablet by mouth twice a day, Disp: 60 tablet, Rfl: 4    insulin detemir (LEVEMIR FLEXPEN) subcutaneous injection pen 100 units/mL, Inject under the skin daily at bedtime 10 units in the am and 10 units in the pm , Disp: , Rfl:     insulin lispro (HUMALOG KWIKPEN) 100 Units/mL SOPN, Inject under the skin 3 (three) times a day with meals 9 units am, 11 units lunch, 14 units pm , Disp: , Rfl:     Insulin Pen Needle (B-D UF III MINI PEN NEEDLES) 31G X 5 MM MISC, by Does not apply route, Disp: , Rfl:     Insulin Pen Needle (B-D ULTRAFINE III SHORT PEN) 31G X 8 MM MISC, by Does not apply route, Disp: , Rfl:     LEVEMIR FLEXTOUCH subcutaneous injection pen 100 units/mL, , Disp: , Rfl:     metoprolol tartrate (LOPRESSOR) 50 mg tablet, Take 50 mg by mouth every 12 (twelve) hours  , Disp: , Rfl:     ONE TOUCH ULTRA TEST test strip, , Disp: , Rfl:     Potassium Chloride (KLOR-CON 10 PO), Take 1 tablet by mouth 2 (two) times a day, Disp: , Rfl:     vitamin B-12 (CYANOCOBALAMIN) 100 MCG tablet, Take by mouth, Disp: , Rfl:     warfarin (COUMADIN) 5 mg tablet, Take 1-2 tablets by mouth daily, Disp: , Rfl:     betamethasone, augmented, (DIPROLENE-AF) 0 05 % cream, , Disp: , Rfl:     Cholecalciferol (VITAMIN D-3) 5000 units TABS, Take by mouth daily, Disp: , Rfl:     desoximetasone (TOPICORT) 0 25 % ointment, , Disp: , Rfl:     mupirocin (BACTROBAN) 2 % ointment, , Disp: , Rfl:     saxagliptin (ONGLYZA) 2 5 MG tablet, Take by mouth daily, Disp: , Rfl:   Allergies   Allergen Reactions    Anacin [Aspirin-Caffeine]     Neosporin  [Neomycin-Bacitracin Zn-Polymyx]        Labs:  Appointment on 10/03/2018   Component Date Value    Hemoglobin A1C 10/03/2018 6 9*    EAG 10/03/2018 151     Sodium 10/03/2018 135*    Potassium 10/03/2018 4 0     Chloride 10/03/2018 102     CO2 10/03/2018 29     ANION GAP 10/03/2018 4     BUN 10/03/2018 22     Creatinine 10/03/2018 1 22     Glucose 10/03/2018 87     Calcium 10/03/2018 8 8     eGFR 10/03/2018 41     Free T4 10/03/2018 1 15     Total Bilirubin 10/03/2018 0 70     Bilirubin, Direct 10/03/2018 0 19     Alkaline Phosphatase 10/03/2018 88     AST 10/03/2018 17     ALT 10/03/2018 22     Total Protein 10/03/2018 7 8     Albumin 10/03/2018 3 1*    Magnesium 10/03/2018 2 3     Phosphorus 10/03/2018 3 2     TSH 3RD GENERATON 10/03/2018 2 730     Vit D, 25-Hydroxy 10/03/2018 51 1     Protime 10/03/2018 24 5*    INR 10/03/2018 2 20*    Creatinine, Ur 10/03/2018 64 4     Microalbum  ,U,Random 10/03/2018 9 9     Microalb Creat Ratio 10/03/2018 15    Transcribe Orders on 10/03/2018   Component Date Value    Clarity, UA 10/03/2018 Cloudy     Color, UA 10/03/2018 Yellow     Specific Gravity, UA 10/03/2018 1 014     pH, UA 10/03/2018 6 5     Glucose, UA 10/03/2018 Negative     Ketones, UA 10/03/2018 Negative     Blood, UA 10/03/2018 Negative     Protein, UA 10/03/2018 Trace*    Nitrite, UA 10/03/2018 Negative     Bilirubin, UA 10/03/2018 Negative     Urobilinogen, UA 10/03/2018 0 2     Leukocytes, UA 10/03/2018 Negative     WBC, UA 10/03/2018 None Seen     RBC, UA 10/03/2018 None Seen     Bacteria, UA 10/03/2018 None Seen     Epithelial Cells 10/03/2018 None Seen      Imaging: No results found      Review of Systems:  Review of Systems   REVIEW OF SYSTEMS:  Constitutional:  Denies fever or chills   Eyes:  Denies change in visual acuity   HENT:  Denies nasal congestion or sore throat   Respiratory:  shortness of breath   Cardiovascular:  edema GI:  Denies abdominal pain, nausea, vomiting, bloody stools or diarrhea   :  Denies dysuria, frequency, difficulty in micturition and nocturia  Musculoskeletal:  Denies back pain or joint pain   Neurologic:  Denies headache, focal weakness or sensory changes   Endocrine:  Denies polyuria or polydipsia   Lymphatic:  Denies swollen glands   Psychiatric:   anxiety     Physical Exam:    /82   Pulse 62   Ht 5' 7" (1 702 m)   Wt 91 3 kg (201 lb 3 2 oz)   SpO2 97%   BMI 31 51 kg/m²     Physical Exam   PHYSICAL EXAM:  General:  Patient is not in acute distress   Head: Normocephalic, Atraumatic  HEENT:  Both pupils normal-size atraumatic, normocephalic, nonicteric  Neck:  JVP not raised  Trachea central  No carotid bruit  Respiratory:  Decreased breath sounds bilaterally  Cardiovascular:  Irregularly irregular  GI:  Abdomen soft nontender  No organomegaly  Lymphatic:  No cervical or inguinal lymphadenopathy  Neurologic:  Patient is awake alert, oriented   Grossly nonfocal  Extremities reveal trace to 1+ edema    Discussion/Summary:  Patient with multiple medical problems who seems to be doing reasonably well from cardiac standpoint  Previous studies reviewed with patient  Medications reviewed and possible side effects discussed  concepts of cardiovascular disease , signs and symptoms of heart disease  Dietary and risk factor modification reinforced  All questions answered  Safety measures reviewed  Patient advised to report any problems prompting medical attention  Importance of salt restriction reinforced with the patient  Patient understands the risks and benefits of anticoagulation to prevent thromboembolic risk from atrial fibrillation  Target INR 2 to 3  Medications were reviewed  Patient and family had a few questions which were answered  Follow-up with primary care physician  Patient did have blood work recently through primary care physician which was reviewed with patient and family  Follow-up in 6 months or earlier as needed  Patient is agreeable with the plan of care

## 2018-12-28 LAB — INR PPP: 2.1 (ref 0.86–1.17)

## 2019-01-02 ENCOUNTER — TELEPHONE (OUTPATIENT)
Dept: CARDIOLOGY CLINIC | Facility: CLINIC | Age: 84
End: 2019-01-02

## 2019-01-02 NOTE — TELEPHONE ENCOUNTER
PT CALLED AND STATED SHE HASNT GOTTEN BACK A CALL WITH HER INR RESULTS   HER LAST INR WAS ON 12/28/18, PT WOULD LIKE A CB AT HOME NUMBER

## 2019-01-07 ENCOUNTER — ANTICOAG VISIT (OUTPATIENT)
Dept: CARDIOLOGY CLINIC | Facility: CLINIC | Age: 84
End: 2019-01-07

## 2019-01-07 NOTE — PROGRESS NOTES
Per Dr [facility] sd recheck 2 weeks, left detailed message on machine-MS

## 2019-01-10 DIAGNOSIS — I48.20 CHRONIC ATRIAL FIBRILLATION (HCC): Primary | ICD-10-CM

## 2019-01-10 RX ORDER — WARFARIN SODIUM 5 MG/1
5-10 TABLET ORAL DAILY
Qty: 180 TABLET | Refills: 3 | Status: SHIPPED | OUTPATIENT
Start: 2019-01-10 | End: 2019-01-14 | Stop reason: SDUPTHER

## 2019-01-11 ENCOUNTER — TELEPHONE (OUTPATIENT)
Dept: CARDIOLOGY CLINIC | Facility: CLINIC | Age: 84
End: 2019-01-11

## 2019-01-11 NOTE — TELEPHONE ENCOUNTER
Pt called and stated she was having trouble with her heart monitor with all light's coming on and off the machine  Pt wanted to let Dr Yvon Hill know it will e fixed by next week

## 2019-01-13 NOTE — TELEPHONE ENCOUNTER
Is this to do with [employer] transmission? Please check if this got resolved  Otherwise she can call the [place] [employer] help line or may be Kaylyn Gutierrez can help    Thank you

## 2019-01-14 DIAGNOSIS — I48.20 CHRONIC ATRIAL FIBRILLATION (HCC): ICD-10-CM

## 2019-01-14 DIAGNOSIS — E87.6 HYPOKALEMIA: Primary | ICD-10-CM

## 2019-01-14 RX ORDER — FUROSEMIDE 20 MG/1
TABLET ORAL
COMMUNITY
Start: 2018-11-08 | End: 2019-05-02 | Stop reason: SDUPTHER

## 2019-01-14 RX ORDER — POTASSIUM CHLORIDE 750 MG/1
10 TABLET, FILM COATED, EXTENDED RELEASE ORAL 2 TIMES DAILY
Qty: 180 TABLET | Refills: 3 | Status: SHIPPED | OUTPATIENT
Start: 2019-01-14 | End: 2019-12-04

## 2019-01-14 RX ORDER — POTASSIUM CHLORIDE 750 MG/1
TABLET, FILM COATED, EXTENDED RELEASE ORAL
COMMUNITY
Start: 2018-10-15 | End: 2020-03-25 | Stop reason: SDUPTHER

## 2019-01-14 RX ORDER — WARFARIN SODIUM 5 MG/1
5-10 TABLET ORAL DAILY
Qty: 180 TABLET | Refills: 0 | Status: SHIPPED | OUTPATIENT
Start: 2019-01-14 | End: 2019-01-15 | Stop reason: SDUPTHER

## 2019-01-14 NOTE — TELEPHONE ENCOUNTER
Called pt's home and no answer  LM asking for a call back to discuss issues with machine along with leaving the customer service number for [employer], which is [phone], that could be contacted to help address monitor

## 2019-01-14 NOTE — TELEPHONE ENCOUNTER
PT NEEDS REFILLS ON POTASSIUM CHLORIDE AND WARFARIN 5MG  (90 DAY SUPPLY) SENT TO  [address] [facility], [address] [facility]   Ruy Mcfadden

## 2019-01-15 DIAGNOSIS — I48.20 CHRONIC ATRIAL FIBRILLATION (HCC): ICD-10-CM

## 2019-01-15 RX ORDER — WARFARIN SODIUM 5 MG/1
5-10 TABLET ORAL DAILY
Qty: 180 TABLET | Refills: 3 | Status: SHIPPED | OUTPATIENT
Start: 2019-01-15 | End: 2019-09-27 | Stop reason: SDUPTHER

## 2019-01-16 ENCOUNTER — REMOTE DEVICE CLINIC VISIT (OUTPATIENT)
Dept: CARDIOLOGY CLINIC | Facility: CLINIC | Age: 84
End: 2019-01-16
Payer: MEDICARE

## 2019-01-16 DIAGNOSIS — I48.20 CHRONIC ATRIAL FIBRILLATION (HCC): ICD-10-CM

## 2019-01-16 DIAGNOSIS — Z95.0 CARDIAC PACEMAKER IN SITU: Primary | ICD-10-CM

## 2019-01-16 PROCEDURE — 93294 REM INTERROG EVL PM/LDLS PM: CPT | Performed by: INTERNAL MEDICINE

## 2019-01-16 PROCEDURE — 93296 REM INTERROG EVL PM/IDS: CPT | Performed by: INTERNAL MEDICINE

## 2019-02-08 ENCOUNTER — TELEPHONE (OUTPATIENT)
Dept: CARDIOLOGY CLINIC | Facility: CLINIC | Age: 84
End: 2019-02-08

## 2019-02-08 NOTE — TELEPHONE ENCOUNTER
Pt called and would like a call back, she would like to know if her transmission for her device was sent over for  1/25/19  Pt stated she spoke with [facility] regarding this matter they assured her the device is working proper , pt needs conformation that her transmissions are working   # [phone]

## 2019-04-02 ENCOUNTER — TELEPHONE (OUTPATIENT)
Dept: CARDIOLOGY CLINIC | Facility: CLINIC | Age: 84
End: 2019-04-02

## 2019-04-10 ENCOUNTER — REMOTE DEVICE CLINIC VISIT (OUTPATIENT)
Dept: CARDIOLOGY CLINIC | Facility: CLINIC | Age: 84
End: 2019-04-10
Payer: MEDICARE

## 2019-04-10 DIAGNOSIS — Z95.0 CARDIAC PACEMAKER IN SITU: ICD-10-CM

## 2019-04-10 DIAGNOSIS — I48.20 CHRONIC ATRIAL FIBRILLATION (HCC): Primary | ICD-10-CM

## 2019-04-10 PROCEDURE — 93294 REM INTERROG EVL PM/LDLS PM: CPT | Performed by: INTERNAL MEDICINE

## 2019-04-10 PROCEDURE — 93296 REM INTERROG EVL PM/IDS: CPT | Performed by: INTERNAL MEDICINE

## 2019-05-02 DIAGNOSIS — R60.0 LOWER EXTREMITY EDEMA: Primary | ICD-10-CM

## 2019-05-02 RX ORDER — FUROSEMIDE 20 MG/1
TABLET ORAL
Qty: 120 TABLET | Refills: 1 | Status: SHIPPED | OUTPATIENT
Start: 2019-05-02 | End: 2019-05-09 | Stop reason: SDUPTHER

## 2019-05-09 ENCOUNTER — OFFICE VISIT (OUTPATIENT)
Dept: CARDIOLOGY CLINIC | Facility: CLINIC | Age: 84
End: 2019-05-09
Payer: MEDICARE

## 2019-05-09 VITALS
HEIGHT: 67 IN | BODY MASS INDEX: 32.3 KG/M2 | HEART RATE: 67 BPM | OXYGEN SATURATION: 98 % | SYSTOLIC BLOOD PRESSURE: 138 MMHG | WEIGHT: 205.8 LBS | DIASTOLIC BLOOD PRESSURE: 70 MMHG

## 2019-05-09 DIAGNOSIS — E78.2 HYPERLIPEMIA, MIXED: ICD-10-CM

## 2019-05-09 DIAGNOSIS — Z79.01 CHRONIC ANTICOAGULATION: ICD-10-CM

## 2019-05-09 DIAGNOSIS — R60.0 LOWER EXTREMITY EDEMA: ICD-10-CM

## 2019-05-09 DIAGNOSIS — I48.20 CHRONIC ATRIAL FIBRILLATION (HCC): Primary | ICD-10-CM

## 2019-05-09 PROCEDURE — 99214 OFFICE O/P EST MOD 30 MIN: CPT | Performed by: INTERNAL MEDICINE

## 2019-05-10 DIAGNOSIS — Z79.01 CHRONIC ANTICOAGULATION: ICD-10-CM

## 2019-05-10 DIAGNOSIS — I48.20 CHRONIC ATRIAL FIBRILLATION (HCC): Primary | ICD-10-CM

## 2019-05-30 ENCOUNTER — ANTICOAG VISIT (OUTPATIENT)
Dept: CARDIOLOGY CLINIC | Facility: CLINIC | Age: 84
End: 2019-05-30

## 2019-06-03 LAB — INR PPP: 1.9 (ref 0.86–1.17)

## 2019-07-10 ENCOUNTER — REMOTE DEVICE CLINIC VISIT (OUTPATIENT)
Dept: CARDIOLOGY CLINIC | Facility: CLINIC | Age: 84
End: 2019-07-10
Payer: MEDICARE

## 2019-07-10 DIAGNOSIS — Z95.0 PRESENCE OF CARDIAC PACEMAKER: Primary | ICD-10-CM

## 2019-07-10 PROCEDURE — 93294 REM INTERROG EVL PM/LDLS PM: CPT | Performed by: INTERNAL MEDICINE

## 2019-07-10 PROCEDURE — 93296 REM INTERROG EVL PM/IDS: CPT | Performed by: INTERNAL MEDICINE

## 2019-07-10 NOTE — PROGRESS NOTES
Results for orders placed or performed in visit on 07/10/19   Cardiac EP device report    Narrative    SJ SINGLE PM  MERLIN TRANSMISSION: BATTERY VOLTAGE ADEQUATE (6 3 YRS)  : 20%  ALL AVAILABLE LEAD PARAMETERS WITHIN NORMAL LIMITS  8 NOISE REVERSION EPISODES W/ AVAIL EGRAMS SHOWING PAC'S, PVC'S, AF  PT TAKES WARFARIN, METOPROLOL TART  EF: 65% ([facility] ST 10/25/16)  PACEMAKER FUNCTIONING APPROPRIATELY    [address] [place] [address]

## 2019-07-19 ENCOUNTER — ANTICOAG VISIT (OUTPATIENT)
Dept: CARDIOLOGY CLINIC | Facility: CLINIC | Age: 84
End: 2019-07-19

## 2019-07-19 LAB — INR PPP: 2.6 (ref 0.84–1.19)

## 2019-09-03 DIAGNOSIS — I50.32 CHRONIC DIASTOLIC CONGESTIVE HEART FAILURE (HCC): ICD-10-CM

## 2019-09-03 RX ORDER — FUROSEMIDE 40 MG/1
40 TABLET ORAL 2 TIMES DAILY
Qty: 180 TABLET | Refills: 3 | Status: SHIPPED | OUTPATIENT
Start: 2019-09-03 | End: 2019-09-05 | Stop reason: SDUPTHER

## 2019-09-05 RX ORDER — FUROSEMIDE 40 MG/1
40 TABLET ORAL 2 TIMES DAILY
Qty: 180 TABLET | Refills: 3 | Status: SHIPPED | OUTPATIENT
Start: 2019-09-05 | End: 2021-11-22 | Stop reason: SDUPTHER

## 2019-09-05 NOTE — TELEPHONE ENCOUNTER
Pt uses [facility] on [address] [place], [place]  Pt script for furosemide went to [place], [place]  Please send to the [facility] in [place]   Thank you

## 2019-09-12 LAB — INR PPP: 2.3 (ref 0.84–1.19)

## 2019-09-13 ENCOUNTER — ANTICOAG VISIT (OUTPATIENT)
Dept: CARDIOLOGY CLINIC | Facility: CLINIC | Age: 84
End: 2019-09-13

## 2019-09-13 DIAGNOSIS — I48.20 CHRONIC ATRIAL FIBRILLATION (HCC): ICD-10-CM

## 2019-09-13 DIAGNOSIS — Z79.01 CHRONIC ANTICOAGULATION: ICD-10-CM

## 2019-09-13 NOTE — PROGRESS NOTES
INR 2 3  INR goal:  2 0-3 0     Warfarin maintenance plan:  2 5 mg, then 5 mg, then 5 mg repeating every 3 days     Afib    Spoke with patient will continue same dose and recheck in 1 month

## 2019-09-27 DIAGNOSIS — I48.20 CHRONIC ATRIAL FIBRILLATION (HCC): ICD-10-CM

## 2019-09-27 RX ORDER — WARFARIN SODIUM 5 MG/1
5-10 TABLET ORAL DAILY
Qty: 180 TABLET | Refills: 3 | Status: SHIPPED | OUTPATIENT
Start: 2019-09-27 | End: 2021-03-04

## 2019-10-23 ENCOUNTER — ANTICOAG VISIT (OUTPATIENT)
Dept: CARDIOLOGY CLINIC | Facility: CLINIC | Age: 84
End: 2019-10-23

## 2019-10-23 ENCOUNTER — TELEPHONE (OUTPATIENT)
Dept: CARDIOLOGY CLINIC | Facility: CLINIC | Age: 84
End: 2019-10-23

## 2019-10-23 DIAGNOSIS — I48.20 CHRONIC ATRIAL FIBRILLATION (HCC): ICD-10-CM

## 2019-10-23 DIAGNOSIS — Z79.01 CHRONIC ANTICOAGULATION: ICD-10-CM

## 2019-10-23 LAB — INR PPP: 2.2 (ref 0.84–1.19)

## 2019-10-23 NOTE — PROGRESS NOTES
Results from 10/17 rec'd today 10/23  S/w pt  Instructed to stay on the same dose and retest in one month  Pt verbally understood  Told her if she does not hear from me the next business day after testing to call me because that means I did not get the results

## 2019-10-23 NOTE — TELEPHONE ENCOUNTER
Pt had her INR blood work done this past Thursday or Friday at [facility]  Please call pt with results   Thank you

## 2019-11-08 ENCOUNTER — IN-CLINIC DEVICE VISIT (OUTPATIENT)
Dept: CARDIOLOGY CLINIC | Facility: CLINIC | Age: 84
End: 2019-11-08
Payer: MEDICARE

## 2019-11-08 DIAGNOSIS — I48.21 PERMANENT ATRIAL FIBRILLATION (HCC): Primary | ICD-10-CM

## 2019-11-08 DIAGNOSIS — Z95.0 PRESENCE OF PERMANENT CARDIAC PACEMAKER: ICD-10-CM

## 2019-11-08 PROCEDURE — 93279 PRGRMG DEV EVAL PM/LDLS PM: CPT | Performed by: INTERNAL MEDICINE

## 2019-11-08 NOTE — PROGRESS NOTES
SJM SINGLE PM  DEVICE INTERROGATED IN THE [place] OFFICE:  BATTERY VOLTAGE ADEQUATE (8 9 YR)    19%   ALL LEAD PARAMETERS WITHIN NORMAL LIMITS   NO HIGH RATE EPISODES   80 NOISE REVERSION EPISODES WITH 53 AVAILABLE EGMS WITH NO NOISE PRESENT   AUTOSENSE SET TO OFF, SENSING SET TO 3 0 MV   AUTOCAPTURE WAS IN HIGH OUTPUT MODE, NOW SET TO OFF   V OUTPUT SET TO 2V/ 5MS    NORMAL DEVICE FUNCTION   RG

## 2019-12-04 ENCOUNTER — OFFICE VISIT (OUTPATIENT)
Dept: CARDIOLOGY CLINIC | Facility: CLINIC | Age: 84
End: 2019-12-04
Payer: MEDICARE

## 2019-12-04 VITALS
SYSTOLIC BLOOD PRESSURE: 138 MMHG | DIASTOLIC BLOOD PRESSURE: 74 MMHG | BODY MASS INDEX: 33.12 KG/M2 | OXYGEN SATURATION: 97 % | HEART RATE: 70 BPM | WEIGHT: 211 LBS | HEIGHT: 67 IN

## 2019-12-04 DIAGNOSIS — Z79.01 CHRONIC ANTICOAGULATION: ICD-10-CM

## 2019-12-04 DIAGNOSIS — I48.20 CHRONIC ATRIAL FIBRILLATION (HCC): Primary | ICD-10-CM

## 2019-12-04 DIAGNOSIS — I50.32 CHRONIC DIASTOLIC CONGESTIVE HEART FAILURE (HCC): ICD-10-CM

## 2019-12-04 DIAGNOSIS — R60.0 LOWER EXTREMITY EDEMA: ICD-10-CM

## 2019-12-04 PROCEDURE — 99214 OFFICE O/P EST MOD 30 MIN: CPT | Performed by: INTERNAL MEDICINE

## 2019-12-04 NOTE — PROGRESS NOTES
[facility] [place]  [address] [place] [address]  Cardiology Follow Up  Please refer to office visit note on the same day

## 2019-12-04 NOTE — PROGRESS NOTES
[facility] [place]  [address] [address]  Cardiology Follow Up    [facility]  1934  [id number]      1  Chronic atrial fibrillation     2  Lower extremity edema     3  Chronic anticoagulation     4  Chronic diastolic congestive heart failure [facility])         Chief Complaint   Patient presents with    Follow-up           Interval History:  Patient presents for follow-up visit  Patient denies any chest pain  No shortness of breath out of the ordinary  Patient does have some leg edema  Patient states that she has not been very careful with her salt  She lives approximately 1 hour away from here  Patient is trying to establish with the local cardiologist soon  Patient denies any bleeding issues  She states that she has been compliant with all her present medications  She is regularly followed by the pacemaker clinic  Patient Active Problem List   Diagnosis    Chronic atrial fibrillation    Chronic anticoagulation    Hyperlipemia, mixed    Lower extremity edema    Cardiac pacemaker in situ     Past Medical History:   Diagnosis Date    [facility]     Chest pain     CHF (congestive heart failure) (HCC)     Diabetes mellitus ([facility] )     Difficulty breathing     HTN (hypertension)     Hyperlipidemia     Sinoatrial node dysfunction (HCC)     SOB (shortness of breath)      Social History     Socioeconomic History    Marital status:       Spouse name: Not on file    Number of children: Not on file    Years of education: Not on file    Highest education level: Not on file   Occupational History    Not on file   Social Needs    Financial resource strain: Not on file    Food insecurity:     Worry: Not on file     Inability: Not on file    Transportation needs:     Medical: Not on file     Non-medical: Not on file   Tobacco Use    Smoking status: Never Smoker    Smokeless tobacco: Never Used   Substance and Sexual Activity    Alcohol use: No    Drug use: Not on file    Sexual activity: Not on file   Lifestyle    Physical activity:     Days per week: Not on file     Minutes per session: Not on file    Stress: Not on file   Relationships    Social connections:     Talks on phone: Not on file     Gets together: Not on file     Attends [employer] service: Not on file     Active member of club or organization: Not on file     Attends meetings of clubs or organizations: Not on file     Relationship status: Not on file    Intimate partner violence:     Fear of current or ex partner: Not on file     Emotionally abused: Not on file     Physically abused: Not on file     Forced sexual activity: Not on file   Other Topics Concern    Not on file   Social History Narrative    Not on file      Family History   Problem Relation Age of Onset    Coronary artery disease Mother     Coronary artery disease Father     Hypertension Father      Past Surgical History:   Procedure Laterality Date    ANGIOPLASTY      APPENDECTOMY      CARDIAC CATHETERIZATION      CATARACT EXTRACTION      CHOLECYSTECTOMY      CORONARY ANGIOPLASTY      INSERT / Ryanna Broussard / REMOVE PACEMAKER      TUBAL LIGATION         Current Outpatient Medications:     aspirin 81 MG tablet, Take 1 tablet by mouth daily, Disp: , Rfl:     atorvastatin (LIPITOR) 20 mg tablet, 20 mg daily  , Disp: , Rfl:     betamethasone, augmented, (DIPROLENE-AF) 0 05 % cream, , Disp: , Rfl:     Cholecalciferol (VITAMIN D-3) 5000 units TABS, Take by mouth daily, Disp: , Rfl:     desoximetasone (TOPICORT) 0 25 % ointment, , Disp: , Rfl:     enalapril (VASOTEC) 10 mg tablet, Take 10 mg by mouth daily  , Disp: , Rfl:     furosemide (LASIX) 40 mg tablet, Take 1 tablet (40 mg total) by mouth 2 (two) times a day, Disp: 180 tablet, Rfl: 3    insulin detemir (LEVEMIR FLEXPEN) subcutaneous injection pen 100 units/mL, Inject under the skin daily at bedtime 10 units in the am and 10 units in the pm , Disp: , Rfl:     insulin lispro (HUMALOG KWIKPEN) 100 Units/mL SOPN, Inject under the skin 3 (three) times a day with meals 9 units am, 11 units lunch, 14 units pm , Disp: , Rfl:     Insulin Pen Needle (B-D UF III MINI PEN NEEDLES) 31G X 5 MM MISC, by Does not apply route, Disp: , Rfl:     Insulin Pen Needle (B-D ULTRAFINE III SHORT PEN) 31G X 8 MM MISC, by Does not apply route , Disp: , Rfl:     KLOR-CON 10 10 MEQ tablet, , Disp: , Rfl:     metoprolol tartrate (LOPRESSOR) 50 mg tablet, Take 50 mg by mouth every 12 (twelve) hours  , Disp: , Rfl:     mupirocin (BACTROBAN) 2 % ointment, , Disp: , Rfl:     ONE TOUCH ULTRA TEST test strip, , Disp: , Rfl:     vitamin B-12 (CYANOCOBALAMIN) 100 MCG tablet, Take by mouth, Disp: , Rfl:     warfarin (COUMADIN) 5 mg tablet, Take 1-2 tablets (5-10 mg total) by mouth daily, Disp: 180 tablet, Rfl: 3  Allergies   Allergen Reactions    Anacin  [Aspirin-Caffeine]     Neosporin  [Neomycin-Bacitracin Zn-Polymyx]        Labs: Anticoag visit on 10/23/2019   Component Date Value    INR 10/23/2019 2 20*     Imaging: No results found      Review of Systems:  Review of Systems   REVIEW OF SYSTEMS:  Constitutional:  Denies fever or chills   Eyes:  Denies change in visual acuity   HENT:  Denies nasal congestion or sore throat   Respiratory:   shortness of breath   Cardiovascular:   edema   GI:  Denies abdominal pain, nausea, vomiting, bloody stools or diarrhea   :  Denies dysuria, frequency, difficulty in micturition and nocturia  Musculoskeletal:  Denies back pain or joint pain   Neurologic:  Denies headache, focal weakness or sensory changes   Endocrine:  Denies polyuria or polydipsia   Lymphatic:  Denies swollen glands   Psychiatric:  Denies depression or anxiety     Physical Exam:    /74   Pulse 70   Ht 5' 7" (1 702 m)   Wt 95 7 kg (211 lb)   SpO2 97%   BMI 33 05 kg/m²     Physical Exam   PHYSICAL EXAM:  General:  Patient is not in acute distress   Head: Normocephalic, Atraumatic  HEENT:  Both pupils normal-size atraumatic, normocephalic, nonicteric  Neck:  JVP not raised  Trachea central  No carotid bruit  Respiratory:  normal breath sounds no crackles  no rhonchi  Cardiovascular:  Irregularly irregular   GI:  Abdomen soft nontender  No organomegaly  Lymphatic:  No cervical or inguinal lymphadenopathy  Neurologic:  Patient is awake alert, oriented   Grossly nonfocal  Extremities 1+ edema    Discussion/Summary:  Patient with multiple medical problems who seems to be doing reasonably well from cardiac standpoint  Previous studies reviewed with patient  Medications reviewed and possible side effects discussed  concepts of cardiovascular disease , signs and symptoms of heart disease  Dietary and risk factor modification reinforced  All questions answered  Safety measures reviewed  Patient advised to report any problems prompting medical attention  Patient understands the risks and benefits of anticoagulation to prevent thromboembolic risk  Target INR 2 to 3  Patient report any bleeding issues  Patient will continue to follow up with pacemaker clinic  Patient encouraged to establish with local cardiologist   Medications reviewed  Importance of salt restriction reinforced with the patient  Follow-up with primary care physician

## 2019-12-19 ENCOUNTER — ANTICOAG VISIT (OUTPATIENT)
Dept: CARDIOLOGY CLINIC | Facility: CLINIC | Age: 84
End: 2019-12-19

## 2019-12-19 DIAGNOSIS — Z79.01 CHRONIC ANTICOAGULATION: ICD-10-CM

## 2019-12-19 DIAGNOSIS — I48.20 CHRONIC ATRIAL FIBRILLATION (HCC): ICD-10-CM

## 2019-12-19 LAB — INR PPP: 2.6 (ref 0.84–1.19)

## 2020-02-12 ENCOUNTER — TELEPHONE (OUTPATIENT)
Dept: CARDIOLOGY CLINIC | Facility: CLINIC | Age: 85
End: 2020-02-12

## 2020-02-12 NOTE — TELEPHONE ENCOUNTER
S/w pt to remind her she is over due to have inr checked  She said she will go tomorrow to have it drawn

## 2020-02-17 ENCOUNTER — ANTICOAG VISIT (OUTPATIENT)
Dept: CARDIOLOGY CLINIC | Facility: CLINIC | Age: 85
End: 2020-02-17

## 2020-02-17 DIAGNOSIS — I48.20 CHRONIC ATRIAL FIBRILLATION (HCC): ICD-10-CM

## 2020-02-17 DIAGNOSIS — Z79.01 CHRONIC ANTICOAGULATION: ICD-10-CM

## 2020-02-17 LAB — INR PPP: 3.3 (ref 0.84–1.19)

## 2020-02-17 NOTE — TELEPHONE ENCOUNTER
Pt called requesting her PT INR results   Pt had her lab done at [facility] [phone]

## 2020-02-19 ENCOUNTER — REMOTE DEVICE CLINIC VISIT (OUTPATIENT)
Dept: CARDIOLOGY CLINIC | Facility: CLINIC | Age: 85
End: 2020-02-19
Payer: MEDICARE

## 2020-02-19 DIAGNOSIS — Z95.0 PRESENCE OF PERMANENT CARDIAC PACEMAKER: Primary | ICD-10-CM

## 2020-02-19 PROCEDURE — 93296 REM INTERROG EVL PM/IDS: CPT | Performed by: INTERNAL MEDICINE

## 2020-02-19 PROCEDURE — 93294 REM INTERROG EVL PM/LDLS PM: CPT | Performed by: INTERNAL MEDICINE

## 2020-02-19 NOTE — PROGRESS NOTES
SJM SINGLE PM  MERLIN TRANSMISSION:  BATTERY VOLTAGE ADEQUATE (10 7 YR)    19%   ALL LEAD PARAMETERS WITHIN NORMAL LIMITS   NO HIGH RATE EPISODES   NORMAL DEVICE FUNCTION   RG

## 2020-03-17 ENCOUNTER — TELEPHONE (OUTPATIENT)
Dept: CARDIOLOGY CLINIC | Facility: CLINIC | Age: 85
End: 2020-03-17

## 2020-03-17 DIAGNOSIS — Z79.01 CHRONIC ANTICOAGULATION: ICD-10-CM

## 2020-03-17 DIAGNOSIS — I48.20 CHRONIC ATRIAL FIBRILLATION (HCC): Primary | ICD-10-CM

## 2020-03-19 NOTE — TELEPHONE ENCOUNTER
Spoke with the pt she wanted her INR orders sent to [facility] in [place]    Faxed INR order to [phone]

## 2020-03-20 ENCOUNTER — ANTICOAG VISIT (OUTPATIENT)
Dept: CARDIOLOGY CLINIC | Facility: CLINIC | Age: 85
End: 2020-03-20

## 2020-03-20 DIAGNOSIS — I48.20 CHRONIC ATRIAL FIBRILLATION (HCC): ICD-10-CM

## 2020-03-20 DIAGNOSIS — Z79.01 CHRONIC ANTICOAGULATION: ICD-10-CM

## 2020-03-20 LAB
INR PPP: 2.5 (ref 0.8–1.2)
PROTHROMBIN TIME: 24.1 SEC (ref 9.1–12)

## 2020-03-25 DIAGNOSIS — I50.32 CHRONIC DIASTOLIC CONGESTIVE HEART FAILURE (HCC): Primary | ICD-10-CM

## 2020-03-25 RX ORDER — POTASSIUM CHLORIDE 750 MG/1
10 TABLET, FILM COATED, EXTENDED RELEASE ORAL 2 TIMES DAILY
Qty: 180 TABLET | Refills: 2 | Status: SHIPPED | OUTPATIENT
Start: 2020-03-25 | End: 2021-01-07 | Stop reason: SDUPTHER

## 2020-05-20 ENCOUNTER — REMOTE DEVICE CLINIC VISIT (OUTPATIENT)
Dept: CARDIOLOGY CLINIC | Facility: CLINIC | Age: 85
End: 2020-05-20
Payer: MEDICARE

## 2020-05-20 DIAGNOSIS — Z95.0 PRESENCE OF PERMANENT CARDIAC PACEMAKER: Primary | ICD-10-CM

## 2020-05-20 PROCEDURE — 93296 REM INTERROG EVL PM/IDS: CPT | Performed by: INTERNAL MEDICINE

## 2020-05-20 PROCEDURE — 93294 REM INTERROG EVL PM/LDLS PM: CPT | Performed by: INTERNAL MEDICINE

## 2020-06-10 LAB
INR PPP: 2.35
PROTHROMBIN TIME: 23.4 SEC (ref 10.1–11.9)

## 2020-06-11 ENCOUNTER — ANTICOAG VISIT (OUTPATIENT)
Dept: CARDIOLOGY CLINIC | Facility: CLINIC | Age: 85
End: 2020-06-11

## 2020-06-11 DIAGNOSIS — I48.20 CHRONIC ATRIAL FIBRILLATION (HCC): ICD-10-CM

## 2020-06-11 DIAGNOSIS — Z79.01 CHRONIC ANTICOAGULATION: ICD-10-CM

## 2020-06-30 NOTE — TELEPHONE ENCOUNTER
Pt called and stated she needs a copy of her lab order to have her PT INR done  Faxed to MICHELLE Shaw in [facility]    FAX# [phone]    Pt would like a call back when order is faxed  Elkin Diamond

## 2020-07-14 LAB
INR PPP: 2.5 (ref 0.8–1.2)
PROTHROMBIN TIME: 24.5 SEC (ref 9.1–12)

## 2020-07-15 ENCOUNTER — ANTICOAG VISIT (OUTPATIENT)
Dept: CARDIOLOGY CLINIC | Facility: CLINIC | Age: 85
End: 2020-07-15

## 2020-07-15 DIAGNOSIS — Z79.01 CHRONIC ANTICOAGULATION: ICD-10-CM

## 2020-07-15 DIAGNOSIS — I48.20 CHRONIC ATRIAL FIBRILLATION (HCC): ICD-10-CM

## 2020-07-15 NOTE — PROGRESS NOTES
Results from 7/10 rec'd 7/15   lmom instructing to stay on the same dose and retest again in 1 month

## 2020-08-19 ENCOUNTER — REMOTE DEVICE CLINIC VISIT (OUTPATIENT)
Dept: CARDIOLOGY CLINIC | Facility: CLINIC | Age: 85
End: 2020-08-19
Payer: MEDICARE

## 2020-08-19 DIAGNOSIS — Z95.0 CARDIAC PACEMAKER IN SITU: Primary | ICD-10-CM

## 2020-08-19 PROCEDURE — 93296 REM INTERROG EVL PM/IDS: CPT | Performed by: INTERNAL MEDICINE

## 2020-08-19 PROCEDURE — 93294 REM INTERROG EVL PM/LDLS PM: CPT | Performed by: INTERNAL MEDICINE

## 2020-08-19 NOTE — PROGRESS NOTES
Results for orders placed or performed in visit on 08/19/20   Cardiac EP device report    Narrative    SJM SINGLE PM  MERLIN TRANSMISSION: BATTERY VOLTAGE ADEQUATE (9 6-9 9 YRS)   21%  ALL AVAILABLE LEAD PARAMETERS WITHIN NORMAL LIMITS  NO SIGNIFICANT HIGH RATE EPISODES  HX: CHRONIC AF & PT ON [address]SA [address], WARFARIN, METOPROLOL TART  PACEMAKER FUNCTIONING APPROPRIATELY           EB

## 2020-09-02 ENCOUNTER — ANTICOAG VISIT (OUTPATIENT)
Dept: CARDIOLOGY CLINIC | Facility: CLINIC | Age: 85
End: 2020-09-02

## 2020-09-02 ENCOUNTER — TELEPHONE (OUTPATIENT)
Dept: CARDIOLOGY CLINIC | Facility: CLINIC | Age: 85
End: 2020-09-02

## 2020-09-02 DIAGNOSIS — I48.20 CHRONIC ATRIAL FIBRILLATION (HCC): ICD-10-CM

## 2020-09-02 DIAGNOSIS — Z79.01 CHRONIC ANTICOAGULATION: ICD-10-CM

## 2020-09-02 LAB
INR PPP: 2.4
INR PPP: 2.4 (ref 0.84–1.19)
PROTHROMBIN TIME: 23.1 SEC (ref 10.1–11.9)

## 2020-09-02 NOTE — TELEPHONE ENCOUNTER
I called the pt with her INR results  She then said that she wanted to make an appt with Dr Belen Anaya  Last seen 12/2019  She did not make an appt then because she lives an hour away and wanted to find a cardiologist closer to her  She wants to stay with Dr Belen Anaya  Please call her to schedule an appt    Thanks

## 2020-10-05 ENCOUNTER — OFFICE VISIT (OUTPATIENT)
Dept: CARDIOLOGY CLINIC | Facility: CLINIC | Age: 85
End: 2020-10-05
Payer: MEDICARE

## 2020-10-05 VITALS
DIASTOLIC BLOOD PRESSURE: 70 MMHG | SYSTOLIC BLOOD PRESSURE: 132 MMHG | BODY MASS INDEX: 32.49 KG/M2 | HEART RATE: 72 BPM | HEIGHT: 67 IN | OXYGEN SATURATION: 98 % | WEIGHT: 207 LBS

## 2020-10-05 DIAGNOSIS — Z79.01 CHRONIC ANTICOAGULATION: ICD-10-CM

## 2020-10-05 DIAGNOSIS — I49.5 SSS (SICK SINUS SYNDROME) (HCC): ICD-10-CM

## 2020-10-05 DIAGNOSIS — R60.0 LOWER EXTREMITY EDEMA: ICD-10-CM

## 2020-10-05 DIAGNOSIS — I48.20 CHRONIC ATRIAL FIBRILLATION (HCC): Primary | ICD-10-CM

## 2020-10-05 LAB
INR PPP: 2.73
PROTHROMBIN TIME: 26.1 SEC (ref 10.1–11.9)

## 2020-10-05 PROCEDURE — 99214 OFFICE O/P EST MOD 30 MIN: CPT | Performed by: INTERNAL MEDICINE

## 2020-10-06 ENCOUNTER — ANTICOAG VISIT (OUTPATIENT)
Dept: CARDIOLOGY CLINIC | Facility: CLINIC | Age: 85
End: 2020-10-06

## 2020-10-06 DIAGNOSIS — Z79.01 CHRONIC ANTICOAGULATION: ICD-10-CM

## 2020-10-06 DIAGNOSIS — I48.20 CHRONIC ATRIAL FIBRILLATION (HCC): ICD-10-CM

## 2020-10-23 ENCOUNTER — HOSPITAL ENCOUNTER (OUTPATIENT)
Dept: NON INVASIVE DIAGNOSTICS | Facility: CLINIC | Age: 85
Discharge: HOME | End: 2020-10-23
Payer: MEDICARE

## 2020-10-23 DIAGNOSIS — R60.0 LOWER EXTREMITY EDEMA: ICD-10-CM

## 2020-10-23 DIAGNOSIS — I48.20 CHRONIC ATRIAL FIBRILLATION (HCC): ICD-10-CM

## 2020-10-23 PROCEDURE — C8929 TTE W OR WO FOL WCON,DOPPLER: HCPCS

## 2020-10-23 PROCEDURE — 93306 TTE W/DOPPLER COMPLETE: CPT | Performed by: INTERNAL MEDICINE

## 2020-10-23 RX ADMIN — PERFLUTREN 0.4 ML/MIN: 6.52 INJECTION, SUSPENSION INTRAVENOUS at 14:58

## 2020-11-13 ENCOUNTER — IN-CLINIC DEVICE VISIT (OUTPATIENT)
Dept: CARDIOLOGY CLINIC | Facility: CLINIC | Age: 85
End: 2020-11-13
Payer: MEDICARE

## 2020-11-13 DIAGNOSIS — Z95.0 PRESENCE OF PERMANENT CARDIAC PACEMAKER: Primary | ICD-10-CM

## 2020-11-13 PROCEDURE — 93279 PRGRMG DEV EVAL PM/LDLS PM: CPT | Performed by: INTERNAL MEDICINE

## 2020-12-07 ENCOUNTER — TELEPHONE (OUTPATIENT)
Dept: CARDIOLOGY CLINIC | Facility: CLINIC | Age: 85
End: 2020-12-07

## 2020-12-07 DIAGNOSIS — I48.20 CHRONIC ATRIAL FIBRILLATION (HCC): Primary | ICD-10-CM

## 2020-12-07 RX ORDER — WARFARIN SODIUM 3 MG/1
TABLET ORAL
Qty: 90 TABLET | Refills: 3 | Status: SHIPPED | OUTPATIENT
Start: 2020-12-07

## 2020-12-07 NOTE — TELEPHONE ENCOUNTER
Refill pending  Per med list patient is on Warfarin 5 mg  Per anticoag visit ~ patient is to be alternating 2 5/5/5    I spoke with patient she stated that she does have 3 mg at home  She is not splitting her 5 mg tabs, she is taking 3/5/5  Refill pending  (Davion Ca )

## 2021-01-07 DIAGNOSIS — I50.32 CHRONIC DIASTOLIC CONGESTIVE HEART FAILURE (HCC): ICD-10-CM

## 2021-01-07 RX ORDER — POTASSIUM CHLORIDE 750 MG/1
10 TABLET, FILM COATED, EXTENDED RELEASE ORAL 2 TIMES DAILY
Qty: 180 TABLET | Refills: 2 | Status: SHIPPED | OUTPATIENT
Start: 2021-01-07 | End: 2021-10-15 | Stop reason: SDUPTHER

## 2021-02-12 ENCOUNTER — TELEPHONE (OUTPATIENT)
Dept: CARDIOLOGY CLINIC | Facility: CLINIC | Age: 86
End: 2021-02-12

## 2021-02-12 DIAGNOSIS — I48.20 CHRONIC ATRIAL FIBRILLATION (HCC): ICD-10-CM

## 2021-02-12 DIAGNOSIS — Z79.01 CHRONIC ANTICOAGULATION: Primary | ICD-10-CM

## 2021-02-12 NOTE — TELEPHONE ENCOUNTER
Pt called & said that she needs a PT INR script faxed to the [facility] in [place]  Pt did not have a phone/fax #

## 2021-02-12 NOTE — TELEPHONE ENCOUNTER
faxed No respiratory distress. No stridor, Lungs sounds clear with good aeration bilaterally. NORMAL WORK OF BREATHING W CLEAR LUNGS - No respiratory distress. No stridor, Lungs sounds clear with good aeration bilaterally.

## 2021-02-16 ENCOUNTER — TELEPHONE (OUTPATIENT)
Dept: CARDIOLOGY CLINIC | Facility: CLINIC | Age: 86
End: 2021-02-16

## 2021-02-24 ENCOUNTER — REMOTE DEVICE CLINIC VISIT (OUTPATIENT)
Dept: CARDIOLOGY CLINIC | Facility: CLINIC | Age: 86
End: 2021-02-24
Payer: MEDICARE

## 2021-02-24 DIAGNOSIS — Z95.0 PRESENCE OF PERMANENT CARDIAC PACEMAKER: Primary | ICD-10-CM

## 2021-02-24 PROCEDURE — 93294 REM INTERROG EVL PM/LDLS PM: CPT | Performed by: INTERNAL MEDICINE

## 2021-02-24 PROCEDURE — 93296 REM INTERROG EVL PM/IDS: CPT | Performed by: INTERNAL MEDICINE

## 2021-02-24 NOTE — PROGRESS NOTES
Results for orders placed or performed in visit on 02/24/21   Cardiac EP device report    Narrative    SJM SINGLE PM/NOT MRI CONDITIONAL  MERLIN TRANSMISSION: BATTERY VOLTAGE ADEQUATE  (8 6 YRS)  21%  ALL AVAILABLE LEAD PARAMETERS WITHIN NORMAL LIMITS  NO SIGNIFICANT HIGH RATE EPISODES  CHRONIC AF PATIENT IS ON WARFARIN AND METOPROLOL TART  NORMAL DEVICE FUNCTION  ---RÍOS

## 2021-02-26 ENCOUNTER — ANTICOAG VISIT (OUTPATIENT)
Dept: CARDIOLOGY CLINIC | Facility: CLINIC | Age: 86
End: 2021-02-26

## 2021-02-26 DIAGNOSIS — Z79.01 CHRONIC ANTICOAGULATION: ICD-10-CM

## 2021-02-26 DIAGNOSIS — I48.20 CHRONIC ATRIAL FIBRILLATION (HCC): ICD-10-CM

## 2021-02-26 LAB — INR PPP: 2.9 (ref 0.84–1.19)

## 2021-03-03 DIAGNOSIS — I48.20 CHRONIC ATRIAL FIBRILLATION (HCC): ICD-10-CM

## 2021-03-04 RX ORDER — WARFARIN SODIUM 5 MG/1
TABLET ORAL
Qty: 180 TABLET | Refills: 3 | Status: SHIPPED | OUTPATIENT
Start: 2021-03-04

## 2021-04-19 ENCOUNTER — ANTICOAG VISIT (OUTPATIENT)
Dept: CARDIOLOGY CLINIC | Facility: CLINIC | Age: 86
End: 2021-04-19

## 2021-04-19 DIAGNOSIS — Z79.01 CHRONIC ANTICOAGULATION: ICD-10-CM

## 2021-04-19 DIAGNOSIS — I48.20 CHRONIC ATRIAL FIBRILLATION (HCC): ICD-10-CM

## 2021-04-19 LAB — INR PPP: 2.4 (ref 0.84–1.19)

## 2021-05-26 ENCOUNTER — REMOTE DEVICE CLINIC VISIT (OUTPATIENT)
Dept: CARDIOLOGY CLINIC | Facility: CLINIC | Age: 86
End: 2021-05-26
Payer: MEDICARE

## 2021-05-26 DIAGNOSIS — Z95.0 PRESENCE OF PERMANENT CARDIAC PACEMAKER: Primary | ICD-10-CM

## 2021-05-26 PROCEDURE — 93296 REM INTERROG EVL PM/IDS: CPT | Performed by: INTERNAL MEDICINE

## 2021-05-26 PROCEDURE — 93294 REM INTERROG EVL PM/LDLS PM: CPT | Performed by: INTERNAL MEDICINE

## 2021-05-26 NOTE — PROGRESS NOTES
Results for orders placed or performed in visit on 05/26/21   Cardiac EP device report    Narrative    SJM SINGLE PM/NOT MRI CONDITIONAL  MERLIN TRANSMISSION: BATTERY VOLTAGE ADEQUATE  (9 6 YRS)  20%  ALL AVAILABLE LEAD PARAMETERS WITHIN NORMAL LIMITS  NO SIGNIFICANT HIGH RATE EPISODES  CHRONIC AF PATIENT IS ON WARFARIN AND METOPROLOL TART  NORMAL DEVICE FUNCTION  ---RÍOS

## 2021-06-29 ENCOUNTER — ANTICOAG VISIT (OUTPATIENT)
Dept: CARDIOLOGY CLINIC | Facility: CLINIC | Age: 86
End: 2021-06-29

## 2021-06-29 DIAGNOSIS — I48.20 CHRONIC ATRIAL FIBRILLATION (HCC): ICD-10-CM

## 2021-06-29 DIAGNOSIS — Z79.01 CHRONIC ANTICOAGULATION: Primary | ICD-10-CM

## 2021-06-29 LAB — INR PPP: 3.2 (ref 0.84–1.19)

## 2021-08-04 ENCOUNTER — ANTICOAG VISIT (OUTPATIENT)
Dept: CARDIOLOGY CLINIC | Facility: CLINIC | Age: 86
End: 2021-08-04

## 2021-08-04 ENCOUNTER — TELEPHONE (OUTPATIENT)
Dept: CARDIOLOGY CLINIC | Facility: CLINIC | Age: 86
End: 2021-08-04

## 2021-08-04 DIAGNOSIS — Z79.01 CHRONIC ANTICOAGULATION: Primary | ICD-10-CM

## 2021-08-04 DIAGNOSIS — I48.20 CHRONIC ATRIAL FIBRILLATION (HCC): ICD-10-CM

## 2021-08-04 LAB — INR PPP: 2.4 (ref 0.84–1.19)

## 2021-08-04 NOTE — TELEPHONE ENCOUNTER
Pt was last seen by you on 12/19/2019  It was recommended that she find a local cardiologist since she lives over an hour away  Pt states she does not wish to find a local cardiologist and wishes to still see you  She is asking when her next f/u should be? Thank you

## 2021-08-24 ENCOUNTER — TELEPHONE (OUTPATIENT)
Dept: OTHER | Facility: OTHER | Age: 86
End: 2021-08-24

## 2021-08-24 ENCOUNTER — OFFICE VISIT (OUTPATIENT)
Dept: CARDIOLOGY CLINIC | Facility: CLINIC | Age: 86
End: 2021-08-24
Payer: MEDICARE

## 2021-08-24 VITALS
DIASTOLIC BLOOD PRESSURE: 76 MMHG | SYSTOLIC BLOOD PRESSURE: 128 MMHG | BODY MASS INDEX: 33.12 KG/M2 | HEIGHT: 67 IN | WEIGHT: 211 LBS | OXYGEN SATURATION: 98 % | HEART RATE: 57 BPM

## 2021-08-24 DIAGNOSIS — I49.5 SSS (SICK SINUS SYNDROME) (HCC): ICD-10-CM

## 2021-08-24 DIAGNOSIS — I48.20 CHRONIC ATRIAL FIBRILLATION (HCC): ICD-10-CM

## 2021-08-24 DIAGNOSIS — R60.0 LOWER EXTREMITY EDEMA: ICD-10-CM

## 2021-08-24 DIAGNOSIS — Z79.01 CHRONIC ANTICOAGULATION: Primary | ICD-10-CM

## 2021-08-24 PROCEDURE — 99214 OFFICE O/P EST MOD 30 MIN: CPT | Performed by: INTERNAL MEDICINE

## 2021-08-24 NOTE — TELEPHONE ENCOUNTER
Patient called to let Dr Claudia Barroso know that she was around 2 grand kids that now have covid  I offered to have a nurse triage her for an order but she is just going to a [facility] to get tested

## 2021-08-24 NOTE — PROGRESS NOTES
[facility] [address] [facility] [address]  Cardiology Follow Up    [facility]  1934  [id number]      1  Chronic anticoagulation     2  Chronic atrial fibrillation (HCC)     3  [facility] (s[facility]) ([facility] )     4  Lower extremity edema         Chief Complaint   Patient presents with    Follow-up       Interval History:   Patient presents for follow-up visit  Patient denies any chest pain  No shortness of breath out of the ordinary  Patient does have slight leg edema  No history of orthopnea PND  No history of presyncope syncope  Patient also has history of pacemaker followed by device Clinic  Patient denies any bleeding issues  She states that she has not been very good on salt restriction  She however states that she has been compliant with all her present medications  Patient Active Problem List   Diagnosis    Chronic atrial fibrillation (HCC)    Chronic anticoagulation    Hyperlipemia, mixed    Lower extremity edema    Cardiac pacemaker in situ     Past Medical History:   Diagnosis Date    At[facility] fibrillation ([facility] )     Chest pain     CHF (congestive heart failure) ([place] [address] )     Diabetes mellitus ([facility] )     Difficulty breathing     HTN (hypertension)     Hyperlipidemia     Sinoatrial node dysfunction (HCC)     SOB (shortness of breath)      Social History     Socioeconomic History    Marital status:       Spouse name: Not on file    Number of children: Not on file    Years of education: Not on file    Highest education level: Not on file   Occupational History    Not on file   Tobacco Use    Smoking status: Never Smoker    Smokeless tobacco: Never Used   Substance and Sexual Activity    Alcohol use: No    Drug use: Not on file    Sexual activity: Not on file   Other Topics Concern    Not on file   Social History Narrative    Not on file     Social Determinants of Health     Financial Resource Strain:     Difficulty of Paying Living Expenses:    Food Insecurity:     Worried About [facility] in the Last Year:     [facility] in the Last Year:    Transportation Needs:     Lack of Transportation (Medical):      Lack of Transportation (Non-Medical):    Physical Activity:     Days of Exercise per Week:     Minutes of Exercise per Session:    Stress:     Feeling of Stress :    Social Connections:     Frequency of Communication with Friends and Family:     Frequency of Social Gatherings with Friends and Family:     Attends Scientologist Services:     Active Member of Clubs or Organizations:     Attends Club or Organization Meetings:     Marital Status:    Intimate Partner Violence:     Fear of Current or Ex-Partner:     Emotionally Abused:     Physically Abused:     Sexually Abused:       Family History   Problem Relation Age of Onset    Coronary artery disease Mother     Coronary artery disease Father     Hypertension Father      Past Surgical History:   Procedure Laterality Date    ANGIOPLASTY      APPENDECTOMY      CARDIAC CATHETERIZATION      CATARACT EXTRACTION      CHOLECYSTECTOMY      CORONARY ANGIOPLASTY      HARSHA / Miguel Vieira / Oliver Isaacs      TUBAL LIGATION         Current Outpatient Medications:     aspirin 81 MG tablet, Take 1 tablet by mouth daily, Disp: , Rfl:     atorvastatin (LIPITOR) 20 mg tablet, 20 mg daily  , Disp: , Rfl:     betamethasone, augmented, (DIPROLENE-AF) 0 05 % cream, , Disp: , Rfl:     Cholecalciferol (VITAMIN D-3) 5000 units TABS, Take by mouth daily, Disp: , Rfl:     desoximetasone (TOPICORT) 0 25 % ointment, , Disp: , Rfl:     enalapril (VASOTEC) 10 mg tablet, Take 10 mg by mouth daily  , Disp: , Rfl:     furosemide (LASIX) 40 mg tablet, Take 1 tablet (40 mg total) by mouth 2 (two) times a day, Disp: 180 tablet, Rfl: 3    insulin detemir (LEVEMIR FLEXPEN) subcutaneous injection pen 100 units/mL, Inject under the skin daily at bedtime 10 units in the am and 10 units in the pm , Disp: , Rfl:     insulin lispro (HUMALOG KWIKPEN) 100 Units/mL SOPN, Inject under the skin 3 (three) times a day with meals 9 units am, 11 units lunch, 14 units pm , Disp: , Rfl:     Insulin Pen Needle (B-D UF III MINI PEN NEEDLES) 31G X 5 MM MISC, by Does not apply route, Disp: , Rfl:     Insulin Pen Needle (B-D ULTRAFINE III SHORT PEN) 31G X 8 MM MISC, by Does not apply route , Disp: , Rfl:     Klor-Con 10 10 MEQ tablet, Take 1 tablet (10 mEq total) by mouth 2 (two) times a day, Disp: 180 tablet, Rfl: 2    metoprolol tartrate (LOPRESSOR) 50 mg tablet, Take 50 mg by mouth every 12 (twelve) hours  , Disp: , Rfl:     mupirocin (BACTROBAN) 2 % ointment, , Disp: , Rfl:     ONE TOUCH ULTRA TEST test strip, , Disp: , Rfl:     vitamin B-12 (CYANOCOBALAMIN) 100 MCG tablet, Take by mouth, Disp: , Rfl:     warfarin (COUMADIN) 3 mg tablet, 1 tab daily or as directed by provider, Disp: 90 tablet, Rfl: 3    warfarin (COUMADIN) 5 mg tablet, take 1 to 2 tablets by mouth once daily, Disp: 180 tablet, Rfl: 3  Allergies   Allergen Reactions    Anacin  [Aspirin-Caffeine]     Bacitracin-Polymyxin B     Neosporin  [Neomycin-Bacitracin Zn-Polymyx]        Labs: Anticoag visit on 08/04/2021   Component Date Value    INR 08/04/2021 2 40*   Anticoag visit on 06/29/2021   Component Date Value    INR 06/29/2021 3 20*     Imaging: No results found      Review of Systems:  Review of Systems     REVIEW OF SYSTEMS:  Constitutional:  Denies fever or chills   Eyes:  Denies change in visual acuity   HENT:  Denies nasal congestion or sore throat   Respiratory:   shortness of breath   Cardiovascular:   edema   GI:  Denies abdominal pain, nausea, vomiting, bloody stools or diarrhea   :  Denies dysuria, frequency, difficulty in micturition and nocturia  Musculoskeletal:  Denies back pain or joint pain   Neurologic:  Denies headache, focal weakness or sensory changes   Endocrine:  Denies polyuria or polydipsia Lymphatic:  Denies swollen glands   Psychiatric:  Denies depression or anxiety     Physical Exam:    /76   Pulse 57   Ht 5' 7" (1 702 m)   Wt 95 7 kg (211 lb)   SpO2 98%   BMI 33 05 kg/m²     Physical Exam   PHYSICAL EXAM:  General:  Patient is not in acute distress   Head: Normocephalic, Atraumatic  HEENT:  Both pupils normal-size atraumatic, normocephalic, nonicteric  Neck:  JVP not raised  Trachea central  No carotid bruit  Respiratory:  Decreased breath sounds bilateral  Cardiovascular:   Irregularly irregular  GI:  Abdomen soft nontender  No organomegaly  Lymphatic:  No cervical or inguinal lymphadenopathy  Neurologic:  Patient is awake alert, oriented   Grossly nonfocal    Extremities trace edema    Discussion/Summary:   Patient with multiple medical problems who seems to be doing reasonably well from cardiac standpoint  Previous studies reviewed with patient  Medications reviewed and possible side effects discussed  concepts of cardiovascular disease , signs and symptoms of heart disease  Dietary and risk factor modification reinforced  All questions answered  Safety measures reviewed  Patient advised to report any problems prompting medical attention  patient is scheduled for remote device transmission for pacemaker tomorrow  Last pacemaker interrogation data reviewed  Normally functioning pacemaker  Battery status is good  Risks and benefits  and alternativesof anticoagulation to prevent thromboembolic risk from atrial fibrillation discussed at length  Patient to report any bleeding issues  Follow-up in 6 months or earlier as needed  Patient is agreeable with the plan of care

## 2021-08-25 ENCOUNTER — REMOTE DEVICE CLINIC VISIT (OUTPATIENT)
Dept: CARDIOLOGY CLINIC | Facility: CLINIC | Age: 86
End: 2021-08-25
Payer: MEDICARE

## 2021-08-25 DIAGNOSIS — Z95.0 PRESENCE OF PERMANENT CARDIAC PACEMAKER: Primary | ICD-10-CM

## 2021-08-25 PROCEDURE — 93294 REM INTERROG EVL PM/LDLS PM: CPT | Performed by: INTERNAL MEDICINE

## 2021-08-25 PROCEDURE — 93296 REM INTERROG EVL PM/IDS: CPT | Performed by: INTERNAL MEDICINE

## 2021-08-25 NOTE — PROGRESS NOTES
Results for orders placed or performed in visit on 08/25/21   Cardiac EP device report    Narrative    SJM SINGLE PM/NOT MRI CONDITIONAL  MERLIN TRANSMISSION: BATTERY VOLTAGE ADEQUATE  (8 6 YRS)  21%  ALL AVAILABLE LEAD PARAMETERS WITHIN NORMAL LIMITS  NO SIGNIFICANT HIGH RATE EPISODES  CHRONIC AF PATIENT IS ON WARFARIN AND METOPROLOL TART  NORMAL DEVICE FUNCTION  ---RÍOS

## 2021-09-29 ENCOUNTER — TELEPHONE (OUTPATIENT)
Dept: CARDIOLOGY CLINIC | Facility: CLINIC | Age: 86
End: 2021-09-29

## 2021-10-15 ENCOUNTER — TELEPHONE (OUTPATIENT)
Dept: CARDIOLOGY CLINIC | Facility: CLINIC | Age: 86
End: 2021-10-15

## 2021-10-15 DIAGNOSIS — I50.32 CHRONIC DIASTOLIC CONGESTIVE HEART FAILURE (HCC): ICD-10-CM

## 2021-10-15 RX ORDER — POTASSIUM CHLORIDE 750 MG/1
10 TABLET, FILM COATED, EXTENDED RELEASE ORAL 2 TIMES DAILY
Qty: 180 TABLET | Refills: 3 | Status: SHIPPED | OUTPATIENT
Start: 2021-10-15 | End: 2021-10-21 | Stop reason: ALTCHOICE

## 2021-11-05 ENCOUNTER — ANTICOAG VISIT (OUTPATIENT)
Dept: CARDIOLOGY CLINIC | Facility: CLINIC | Age: 86
End: 2021-11-05

## 2021-11-05 DIAGNOSIS — I48.20 CHRONIC ATRIAL FIBRILLATION (HCC): ICD-10-CM

## 2021-11-05 DIAGNOSIS — Z79.01 CHRONIC ANTICOAGULATION: Primary | ICD-10-CM

## 2021-11-05 LAB — INR PPP: 2.2 (ref 0.84–1.19)

## 2021-11-19 DIAGNOSIS — I50.32 CHRONIC DIASTOLIC CONGESTIVE HEART FAILURE (HCC): ICD-10-CM

## 2021-11-22 RX ORDER — POTASSIUM CHLORIDE 750 MG/1
10 TABLET, EXTENDED RELEASE ORAL 2 TIMES DAILY
Start: 2021-11-22 | End: 2022-02-02 | Stop reason: SDUPTHER

## 2021-11-22 RX ORDER — FUROSEMIDE 20 MG/1
20 TABLET ORAL 2 TIMES DAILY
Start: 2021-11-22 | End: 2022-02-03 | Stop reason: SDUPTHER

## 2021-11-30 ENCOUNTER — ANTICOAG VISIT (OUTPATIENT)
Dept: CARDIOLOGY CLINIC | Facility: CLINIC | Age: 86
End: 2021-11-30

## 2021-11-30 DIAGNOSIS — I48.20 CHRONIC ATRIAL FIBRILLATION (HCC): ICD-10-CM

## 2021-11-30 DIAGNOSIS — Z79.01 CHRONIC ANTICOAGULATION: Primary | ICD-10-CM

## 2021-11-30 LAB — INR PPP: 2.3 (ref 0.84–1.19)

## 2022-01-14 ENCOUNTER — IN-CLINIC DEVICE VISIT (OUTPATIENT)
Dept: CARDIOLOGY CLINIC | Facility: CLINIC | Age: 87
End: 2022-01-14
Payer: MEDICARE

## 2022-01-14 DIAGNOSIS — Z95.0 PRESENCE OF PERMANENT CARDIAC PACEMAKER: Primary | ICD-10-CM

## 2022-01-14 PROCEDURE — 93279 PRGRMG DEV EVAL PM/LDLS PM: CPT | Performed by: INTERNAL MEDICINE

## 2022-01-14 NOTE — PROGRESS NOTES
SJM SINGLE PM/NOT MRI CONDITIONAL   DEVICE INTERROGATED IN THE [place] OFFICE:  BATTERY VOLTAGE ADEQUATE (8 7 YR)    21%   ALL LEAD PARAMETERS WITHIN NORMAL LIMITS   NO SIGNIFICANT HIGH RATE EPISODES   NO PROGRAMMING CHANGES MADE TO DEVICE PARAMETERS   NORMAL DEVICE FUNCTION   RG

## 2022-02-02 DIAGNOSIS — I50.32 CHRONIC DIASTOLIC CONGESTIVE HEART FAILURE (HCC): ICD-10-CM

## 2022-02-02 NOTE — TELEPHONE ENCOUNTER
Name of Caller: Patient     Call back Number: [phone]    Medication(s): Potassium    Are we prescribing provider?: Yes    30 or 90 day supply: 90 Day    Pharmacy name/number: [facility]     Patient would like a return c/b to clarify the dosage. 10mg or 20mg ? ?

## 2022-02-03 ENCOUNTER — TELEPHONE (OUTPATIENT)
Dept: CARDIOLOGY CLINIC | Facility: CLINIC | Age: 87
End: 2022-02-03

## 2022-02-03 RX ORDER — FUROSEMIDE 40 MG/1
40 TABLET ORAL 2 TIMES DAILY
Qty: 180 TABLET | Refills: 3
Start: 2022-02-03

## 2022-02-03 RX ORDER — POTASSIUM CHLORIDE 20 MEQ/1
20 TABLET, EXTENDED RELEASE ORAL 2 TIMES DAILY
Qty: 180 TABLET | Refills: 3 | Status: SHIPPED | OUTPATIENT
Start: 2022-02-03 | End: 2023-01-26 | Stop reason: SDUPTHER

## 2022-02-03 NOTE — TELEPHONE ENCOUNTER
Called and s/w pt to remind her she is over due to have her INR checked  States she is not driving herself at the moment, but plans on going next week

## 2022-02-03 NOTE — TELEPHONE ENCOUNTER
Meds pending/updated. S/w patient, stated that she saw PCP about 2-3 weeks ago and due to swelling Furosemide was increased from 20 mg BID to 40 mg BID.

## 2022-02-08 ENCOUNTER — ANTICOAG VISIT (OUTPATIENT)
Dept: CARDIOLOGY CLINIC | Facility: CLINIC | Age: 87
End: 2022-02-08

## 2022-02-08 DIAGNOSIS — I48.20 CHRONIC ATRIAL FIBRILLATION (HCC): ICD-10-CM

## 2022-02-08 DIAGNOSIS — Z79.01 CHRONIC ANTICOAGULATION: Primary | ICD-10-CM

## 2022-02-08 LAB — INR PPP: 2.12 (ref 0.84–1.19)

## 2022-02-15 ENCOUNTER — TELEPHONE (OUTPATIENT)
Dept: CARDIOLOGY CLINIC | Facility: CLINIC | Age: 87
End: 2022-02-15

## 2022-02-25 ENCOUNTER — ANTICOAG VISIT (OUTPATIENT)
Dept: CARDIOLOGY CLINIC | Facility: CLINIC | Age: 87
End: 2022-02-25

## 2022-02-25 DIAGNOSIS — Z79.01 CHRONIC ANTICOAGULATION: Primary | ICD-10-CM

## 2022-02-25 DIAGNOSIS — I48.20 CHRONIC ATRIAL FIBRILLATION (HCC): ICD-10-CM

## 2022-02-25 LAB — INR PPP: 3.1 (ref 0.84–1.19)

## 2022-03-03 ENCOUNTER — TELEPHONE (OUTPATIENT)
Dept: CARDIOLOGY CLINIC | Facility: CLINIC | Age: 87
End: 2022-03-03

## 2022-03-03 NOTE — TELEPHONE ENCOUNTER
Yvon Shah is not on her communication consent form so I am unable to discuss this with him  I called Yvon Shah and made him aware  Pt was not home so I suggested she call me back to give a verbal consent and then I will be glad to discuss this with him

## 2022-03-03 NOTE — TELEPHONE ENCOUNTER
Name of Caller: Moriah Willis, patient's son    Problem/Symptoms: Medication clarification regarding the Warfarin  Patient has 2 bottles one is for 3mg and the other is for 5mg  Which one should the patient be taking ?     Call back number: [phone]

## 2022-04-12 ENCOUNTER — ANTICOAG VISIT (OUTPATIENT)
Dept: CARDIOLOGY CLINIC | Facility: CLINIC | Age: 87
End: 2022-04-12

## 2022-04-12 ENCOUNTER — TELEPHONE (OUTPATIENT)
Dept: CARDIOLOGY CLINIC | Facility: CLINIC | Age: 87
End: 2022-04-12

## 2022-04-12 DIAGNOSIS — Z79.01 CHRONIC ANTICOAGULATION: Primary | ICD-10-CM

## 2022-04-12 DIAGNOSIS — I48.20 CHRONIC ATRIAL FIBRILLATION (HCC): ICD-10-CM

## 2022-04-12 LAB — INR PPP: 2.6 (ref 0.84–1.19)

## 2022-04-20 ENCOUNTER — REMOTE DEVICE CLINIC VISIT (OUTPATIENT)
Dept: CARDIOLOGY CLINIC | Facility: CLINIC | Age: 87
End: 2022-04-20
Payer: MEDICARE

## 2022-04-20 DIAGNOSIS — Z95.0 CARDIAC PACEMAKER IN SITU: Primary | ICD-10-CM

## 2022-04-20 PROCEDURE — 93294 REM INTERROG EVL PM/LDLS PM: CPT | Performed by: INTERNAL MEDICINE

## 2022-04-20 PROCEDURE — 93296 REM INTERROG EVL PM/IDS: CPT | Performed by: INTERNAL MEDICINE

## 2022-04-20 NOTE — PROGRESS NOTES
Results for orders placed or performed in visit on 04/20/22   Cardiac EP device report    Narrative    SJM SINGLE PM/NOT MRI CONDITIONAL  MERLIN TRANSMISSION: BATTERY VOLTAGE ADEQUATE (7 6-7 9 YRS)  -21%  ALL AVAILABLE LEAD PARAMETERS WITHIN NORMAL LIMITS  NO SIGNIFICANT HIGH RATE EPISODES  NORMAL DEVICE FUNCTION   GV

## 2022-04-21 ENCOUNTER — ANTICOAG VISIT (OUTPATIENT)
Dept: CARDIOLOGY CLINIC | Facility: CLINIC | Age: 87
End: 2022-04-21

## 2022-04-21 DIAGNOSIS — Z79.01 CHRONIC ANTICOAGULATION: Primary | ICD-10-CM

## 2022-04-21 DIAGNOSIS — I48.20 CHRONIC ATRIAL FIBRILLATION (HCC): ICD-10-CM

## 2022-04-21 LAB — INR PPP: 2.6 (ref 0.84–1.19)

## 2022-05-02 ENCOUNTER — TELEPHONE (OUTPATIENT)
Dept: CARDIOLOGY CLINIC | Facility: CLINIC | Age: 87
End: 2022-05-02

## 2022-05-02 NOTE — TELEPHONE ENCOUNTER
Rec'd a request for a new standing INR order form from [facility]  Form was signed and faxed back to [facility]

## 2022-06-16 DIAGNOSIS — Z79.01 CHRONIC ANTICOAGULATION: ICD-10-CM

## 2022-06-16 DIAGNOSIS — Z79.01 LONG TERM (CURRENT) USE OF ANTICOAGULANTS: Primary | ICD-10-CM

## 2022-06-17 ENCOUNTER — ANTICOAG VISIT (OUTPATIENT)
Dept: CARDIOLOGY CLINIC | Facility: CLINIC | Age: 87
End: 2022-06-17

## 2022-06-17 DIAGNOSIS — I48.20 CHRONIC ATRIAL FIBRILLATION (HCC): ICD-10-CM

## 2022-06-17 DIAGNOSIS — Z79.01 CHRONIC ANTICOAGULATION: Primary | ICD-10-CM

## 2022-06-17 LAB — INR PPP: 2.11 (ref 0.84–1.19)

## 2022-06-22 ENCOUNTER — OFFICE VISIT (OUTPATIENT)
Dept: CARDIOLOGY CLINIC | Facility: CLINIC | Age: 87
End: 2022-06-22
Payer: MEDICARE

## 2022-06-22 VITALS
HEIGHT: 67 IN | OXYGEN SATURATION: 97 % | BODY MASS INDEX: 33.68 KG/M2 | HEART RATE: 74 BPM | SYSTOLIC BLOOD PRESSURE: 138 MMHG | WEIGHT: 214.6 LBS | DIASTOLIC BLOOD PRESSURE: 84 MMHG

## 2022-06-22 DIAGNOSIS — Z79.01 CHRONIC ANTICOAGULATION: ICD-10-CM

## 2022-06-22 DIAGNOSIS — I48.20 CHRONIC ATRIAL FIBRILLATION (HCC): Primary | ICD-10-CM

## 2022-06-22 DIAGNOSIS — I50.32 CHRONIC DIASTOLIC CONGESTIVE HEART FAILURE (HCC): ICD-10-CM

## 2022-06-22 DIAGNOSIS — R60.0 LOWER EXTREMITY EDEMA: ICD-10-CM

## 2022-06-22 PROCEDURE — 99214 OFFICE O/P EST MOD 30 MIN: CPT | Performed by: INTERNAL MEDICINE

## 2022-06-22 NOTE — PROGRESS NOTES
[facility] [address] [place] [address] [address]  Cardiology Follow Up    [facility]  1934  [id number]      1  Chronic atrial fibrillation ([facility] )     2  Chronic anticoagulation     3  Chronic diastolic congestive heart failure ([facility] )     4  Lower extremity edema         Chief Complaint   Patient presents with    Follow-up     Routine follow up       Interval History:  Patient presents for follow-up visit  Patient denies any chest pain  No shortness of breath out of the ordinary  Patient does have chronic lower extremity edema which is stable  Patient has been trying to do her best in terms of salt restriction  No bleeding issues  She states that she has been compliant all her present medications  She is also regularly followed by the pacemaker clinic  Patient Active Problem List   Diagnosis    Chronic atrial fibrillation (HCC)    Chronic anticoagulation    Hyperlipemia, mixed    Lower extremity edema    Cardiac pacemaker in situ     Past Medical History:   Diagnosis Date    At[facility] fibrillation ([facility] )     Chest pain     [facility]     [facility]     Difficulty breathing     HTN (hypertension)     Hyperlipidemia     Sinoatrial node dysfunction (HCC)     SOB (shortness of breath)      Social History     Socioeconomic History    Marital status:       Spouse name: Not on file    Number of children: Not on file    Years of education: Not on file    Highest education level: Not on file   Occupational History    Not on file   Tobacco Use    Smoking status: Never Smoker    Smokeless tobacco: Never Used   Substance and Sexual Activity    Alcohol use: No    Drug use: Not on file    Sexual activity: Not on file   Other Topics Concern    Not on file   Social History Narrative    Not on file     Social Determinants of Health     Financial Resource Strain: Not on file   Food Insecurity: Not on file Transportation Needs: Not on file   Physical Activity: Not on file   Stress: Not on file   Social Connections: Not on file   Intimate Partner Violence: Not on file   Housing Stability: Not on file      Family History   Problem Relation Age of Onset    Coronary artery disease Mother     Coronary artery disease Father     Hypertension Father      Past Surgical History:   Procedure Laterality Date    ANGIOPLASTY      APPENDECTOMY      CARDIAC CATHETERIZATION      CATARACT EXTRACTION      CHOLECYSTECTOMY      CORONARY ANGIOPLASTY      INSERT / REPLACE / REMOVE PACEMAKER      TUBAL LIGATION         Current Outpatient Medications:     aspirin 81 MG tablet, Take 1 tablet by mouth daily, Disp: , Rfl:     atorvastatin (LIPITOR) 20 mg tablet, 20 mg daily  , Disp: , Rfl:     Cholecalciferol (VITAMIN D-3) 5000 units TABS, Take by mouth daily, Disp: , Rfl:     furosemide (LASIX) 40 mg tablet, Take 1 tablet (40 mg total) by mouth 2 (two) times a day, Disp: 180 tablet, Rfl: 3    insulin detemir (LEVEMIR FLEXTOUCH) 100 Units/mL injection pen, Inject under the skin daily at bedtime 10 units in the am and 10 units in the pm , Disp: , Rfl:     insulin lispro (HumaLOG) 100 units/mL injection pen, Inject under the skin 3 (three) times a day with meals 9 units am, 11 units lunch, 14 units pm , Disp: , Rfl:     Insulin Pen Needle (B-D ULTRAFINE III SHORT PEN) 31G X 8 MM MISC, by Does not apply route , Disp: , Rfl:     Insulin Pen Needle 31G X 5 MM MISC, by Does not apply route, Disp: , Rfl:     metoprolol tartrate (LOPRESSOR) 50 mg tablet, Take 50 mg by mouth every 12 (twelve) hours  , Disp: , Rfl:     mupirocin (BACTROBAN) 2 % ointment, , Disp: , Rfl:     ONE TOUCH ULTRA TEST test strip, , Disp: , Rfl:     potassium chloride (K-DUR,KLOR-CON) 20 mEq tablet, Take 1 tablet (20 mEq total) by mouth 2 (two) times a day, Disp: 180 tablet, Rfl: 3    vitamin B-12 (CYANOCOBALAMIN) 100 MCG tablet, Take by mouth, Disp: , Rfl:     warfarin (COUMADIN) 3 mg tablet, 1 tab daily or as directed by provider, Disp: 90 tablet, Rfl: 3    warfarin (COUMADIN) 5 mg tablet, take 1 to 2 tablets by mouth once daily, Disp: 180 tablet, Rfl: 3    betamethasone, augmented, (DIPROLENE-AF) 0 05 % cream, , Disp: , Rfl:     desoximetasone (TOPICORT) 0 25 % ointment, , Disp: , Rfl:     enalapril (VASOTEC) 10 mg tablet, Take 10 mg by mouth daily   (Patient not taking: Reported on 6/22/2022), Disp: , Rfl:   Allergies   Allergen Reactions    Anacin  [Aspirin-Caffeine]     Bacitracin-Polymyxin B     Neosporin  [Neomycin-Bacitracin Zn-Polymyx]        Labs: Anticoag visit on 06/17/2022   Component Date Value    INR 06/17/2022 2 11 (A)     Imaging: No results found  Review of Systems:  Review of Systems   REVIEW OF SYSTEMS:  Constitutional:  Denies fever or chills   Eyes:  Denies change in visual acuity   HENT:  Denies nasal congestion or sore throat   Respiratory:   shortness of breath   Cardiovascular:   edema   GI:  Denies abdominal pain, nausea, vomiting, bloody stools or diarrhea   :  Denies dysuria, frequency, difficulty in micturition and nocturia  Musculoskeletal:  Denies back pain or joint pain   Neurologic:  Denies headache, focal weakness or sensory changes   Endocrine:  Denies polyuria or polydipsia   Lymphatic:  Denies swollen glands   Psychiatric:  Denies depression or anxiety     Physical Exam:    /84 (BP Location: Left arm, Patient Position: Sitting, Cuff Size: Standard)   Pulse 74   Ht 5' 7" (1 702 m)   Wt 97 3 kg (214 lb 9 6 oz)   SpO2 97%   BMI 33 61 kg/m²     Physical Exam   PHYSICAL EXAM:  General:  Patient is not in acute distress   Head: Normocephalic, Atraumatic  HEENT:  Both pupils normal-size atraumatic, normocephalic, nonicteric  Neck:  JVP not raised  Trachea central  No carotid bruit  Respiratory:  normal breath sounds no crackles   no rhonchi  Cardiovascular:  Irregularly irregular  GI:  Abdomen soft nontender  No organomegaly  Lymphatic:  No cervical or inguinal lymphadenopathy  Neurologic:  Patient is awake alert, oriented   Grossly nonfocal  Extremities 1+ edema    Pacemaker interrogation data reviewed  Normally functioning pacemaker  Battery status is good  Discussion/Summary:  Patient with multiple medical problems who seems to be doing reasonably well from cardiac standpoint  Previous studies reviewed with patient  Medications reviewed and possible side effects discussed  concepts of cardiovascular disease , signs and symptoms of heart disease  Dietary and risk factor modification reinforced  All questions answered  Safety measures reviewed  Patient advised to report any problems prompting medical attention  Risks and benefits  and alternativesof anticoagulation to prevent thromboembolic risk from atrial fibrillation discussed at length  Patient to report any bleeding issues  Target INR 2 to 3  Patient will continue to follow up with pacemaker clinic  Importance of salt restriction and compliance with medications reinforced  Follow-up in 6 months or earlier as needed  Follow-up with primary care physician

## 2022-07-15 ENCOUNTER — ANTICOAG VISIT (OUTPATIENT)
Dept: CARDIOLOGY CLINIC | Facility: CLINIC | Age: 87
End: 2022-07-15

## 2022-07-15 DIAGNOSIS — I48.20 CHRONIC ATRIAL FIBRILLATION (HCC): ICD-10-CM

## 2022-07-15 DIAGNOSIS — Z79.01 CHRONIC ANTICOAGULATION: Primary | ICD-10-CM

## 2022-07-15 LAB — INR PPP: 2.2 (ref 0.84–1.19)

## 2022-07-20 ENCOUNTER — REMOTE DEVICE CLINIC VISIT (OUTPATIENT)
Dept: CARDIOLOGY CLINIC | Facility: CLINIC | Age: 87
End: 2022-07-20
Payer: MEDICARE

## 2022-07-20 DIAGNOSIS — Z95.0 PRESENCE OF CARDIAC PACEMAKER: Primary | ICD-10-CM

## 2022-07-20 PROCEDURE — 93296 REM INTERROG EVL PM/IDS: CPT | Performed by: INTERNAL MEDICINE

## 2022-07-20 PROCEDURE — 93294 REM INTERROG EVL PM/LDLS PM: CPT | Performed by: INTERNAL MEDICINE

## 2022-07-20 NOTE — PROGRESS NOTES
Results for orders placed or performed in visit on 07/20/22   Cardiac EP device report    Narrative    SJM SINGLE PM/NOT MRI CONDITIONAL  MERLIN TRANSMISSION: BATTERY VOLTAGE ADEQUATE (3 3 YRS)  : 20%  ALL AVAILABLE LEAD PARAMETERS WITHIN NORMAL LIMITS  NO SIGNIFICANT HIGH RATE EPISODES  PACEMAKER FUNCTIONING APPROPRIATELY   [address] [place] [address]

## 2022-07-28 ENCOUNTER — TELEPHONE (OUTPATIENT)
Dept: CARDIOLOGY CLINIC | Facility: CLINIC | Age: 87
End: 2022-07-28

## 2022-07-28 NOTE — TELEPHONE ENCOUNTER
Patient called & stated that she is having trouble swallowing her potassium chloride (K-DUR,KLOR-CON) 20 mEq tablet & would like to know what can be done?     Please call pt back at [phone]

## 2022-08-12 DIAGNOSIS — I48.20 CHRONIC ATRIAL FIBRILLATION (HCC): ICD-10-CM

## 2022-08-12 RX ORDER — WARFARIN SODIUM 3 MG/1
TABLET ORAL
Qty: 90 TABLET | Refills: 3 | Status: SHIPPED | OUTPATIENT
Start: 2022-08-12

## 2022-09-06 ENCOUNTER — ANTICOAG VISIT (OUTPATIENT)
Dept: CARDIOLOGY CLINIC | Facility: CLINIC | Age: 87
End: 2022-09-06

## 2022-09-06 DIAGNOSIS — I48.20 CHRONIC ATRIAL FIBRILLATION (HCC): ICD-10-CM

## 2022-09-06 DIAGNOSIS — Z79.01 CHRONIC ANTICOAGULATION: Primary | ICD-10-CM

## 2022-09-06 LAB — INR PPP: 2.11 (ref 0.84–1.19)

## 2022-09-13 ENCOUNTER — TELEPHONE (OUTPATIENT)
Dept: CARDIOLOGY CLINIC | Facility: CLINIC | Age: 87
End: 2022-09-13

## 2022-10-19 ENCOUNTER — REMOTE DEVICE CLINIC VISIT (OUTPATIENT)
Dept: CARDIOLOGY CLINIC | Facility: CLINIC | Age: 87
End: 2022-10-19
Payer: MEDICARE

## 2022-10-19 DIAGNOSIS — Z95.0 PRESENCE OF CARDIAC PACEMAKER: Primary | ICD-10-CM

## 2022-10-19 PROCEDURE — 93294 REM INTERROG EVL PM/LDLS PM: CPT | Performed by: INTERNAL MEDICINE

## 2022-10-19 PROCEDURE — 93296 REM INTERROG EVL PM/IDS: CPT | Performed by: INTERNAL MEDICINE

## 2022-10-19 NOTE — PROGRESS NOTES
Results for orders placed or performed in visit on 10/19/22   Cardiac EP device report    Narrative    SJM SINGLE PM/NOT MRI CONDITIONAL  MERLIN TRANSMISSION: BATTERY VOLTAGE ADEQUATE (3 2 YRS)  : 22%  ALL AVAILABLE LEAD PARAMETERS WITHIN NORMAL LIMITS  NO SIGNIFICANT HIGH RATE EPISODES (VHR EPISODES PREVIOUSLY ADDRESSED)  PACEMAKER FUNCTIONING APPROPRIATELY    [address] [place] [address]

## 2023-01-17 ENCOUNTER — TELEPHONE (OUTPATIENT)
Dept: CARDIOLOGY CLINIC | Facility: CLINIC | Age: 88
End: 2023-01-17

## 2023-01-17 DIAGNOSIS — Z79.01 LONG TERM (CURRENT) USE OF ANTICOAGULANTS: Primary | ICD-10-CM

## 2023-01-17 NOTE — TELEPHONE ENCOUNTER
S/w pt  I explained that an order was placed in Dec, she has 91 visits left and it does not  until   She states the lab told her she needs another order  The fax # provided is our fax number  She provided me with another number of [phone]  Script was faxed there  I also  faxed it to [phone] which I had in her chart as the lab fax number

## 2023-01-17 NOTE — TELEPHONE ENCOUNTER
Patient needs an order to have her INR done      Please fax to [facility] at [phone] Patient thinks this is the fax number    Aidan Thacker -[phone]

## 2023-01-26 ENCOUNTER — ANTICOAG VISIT (OUTPATIENT)
Dept: CARDIOLOGY CLINIC | Facility: CLINIC | Age: 88
End: 2023-01-26

## 2023-01-26 DIAGNOSIS — I50.32 CHRONIC DIASTOLIC CONGESTIVE HEART FAILURE (HCC): ICD-10-CM

## 2023-01-26 DIAGNOSIS — Z79.01 CHRONIC ANTICOAGULATION: Primary | ICD-10-CM

## 2023-01-26 DIAGNOSIS — I48.20 CHRONIC ATRIAL FIBRILLATION (HCC): ICD-10-CM

## 2023-01-26 LAB — INR PPP: 2.4 (ref 0.84–1.19)

## 2023-01-26 RX ORDER — POTASSIUM CHLORIDE 20 MEQ/1
20 TABLET, EXTENDED RELEASE ORAL 2 TIMES DAILY
Qty: 180 TABLET | Refills: 3 | Status: SHIPPED | OUTPATIENT
Start: 2023-01-26

## 2023-02-06 ENCOUNTER — TELEPHONE (OUTPATIENT)
Dept: CARDIOLOGY CLINIC | Facility: CLINIC | Age: 88
End: 2023-02-06

## 2023-02-27 ENCOUNTER — TELEPHONE (OUTPATIENT)
Dept: OTHER | Facility: OTHER | Age: 88
End: 2023-02-27

## 2023-02-27 LAB — INR PPP: 4.43 (ref 0.84–1.19)

## 2023-02-27 NOTE — TELEPHONE ENCOUNTER
Lab Result: 4 43 INR   Date/Time Drawn: 02/27    Ordering Provider: [address] [facility]  [address] Name: Dudley Parrish        The following critical/stat result was read back to the lab as stated above and [employer] to the on-call provider  The provider confirmed receipt of the message

## 2023-02-28 ENCOUNTER — ANTICOAG VISIT (OUTPATIENT)
Dept: CARDIOLOGY CLINIC | Facility: CLINIC | Age: 88
End: 2023-02-28

## 2023-02-28 DIAGNOSIS — I48.20 CHRONIC ATRIAL FIBRILLATION (HCC): ICD-10-CM

## 2023-02-28 DIAGNOSIS — Z79.01 CHRONIC ANTICOAGULATION: Primary | ICD-10-CM

## 2023-02-28 NOTE — PROGRESS NOTES
Pt was instructed by Dr Charlotte Richardson on 2/27/23 to hold for 2 days then resume reg dose and retest one week  Pt understood

## 2023-03-02 ENCOUNTER — IN-CLINIC DEVICE VISIT (OUTPATIENT)
Dept: CARDIOLOGY CLINIC | Facility: CLINIC | Age: 88
End: 2023-03-02

## 2023-03-02 ENCOUNTER — TELEPHONE (OUTPATIENT)
Dept: CARDIOLOGY CLINIC | Facility: CLINIC | Age: 88
End: 2023-03-02

## 2023-03-02 DIAGNOSIS — Z95.0 PRESENCE OF PERMANENT CARDIAC PACEMAKER: Primary | ICD-10-CM

## 2023-03-02 NOTE — PROGRESS NOTES
Results for orders placed or performed in visit on 03/02/23   Cardiac EP device report    Narrative    SJM SINGLE PM/NOT MRI CONDITIONAL  DEVICE INTERROGATED IN THE [place] OFFICE: BATTERY VOLTAGE ADEQUATE (2 9 YRS)   23% ALL LEAD PARAMETERS WITHIN NORMAL LIMITS  ONE VHR EPISODE WITH EGM SHOWING PROBABLE RVR AT ABOUT 200 BPM  TAKES WARFARIN & METOPROLOL TART  NO PROGRAMMING CHANGES MADE TO DEVICE PARAMETERS  NORMAL DEVICE FUNCTION   AM/RG

## 2023-03-02 NOTE — TELEPHONE ENCOUNTER
Spoke to pt's son Jorge Waller  Advised him that Dr Jeremy Ontiveros couldn't make any local recommendations  States that he will do his homework and attempt to find a cardiologist closer to them  Will ctc the office once one is found so that we can forward pt's med recs  Jorge Waller verbalized understanding

## 2023-03-02 NOTE — TELEPHONE ENCOUNTER
Pt's son Jorge Vieragabby called & would like to know if Dr Jeremy Ontiveros can recommend a cardiologist closer to home for pt  Jorge Waller states he drives 2 hours to our office and it's a bit much         Please Advise    Please contact Jorge Waller at [phone]

## 2023-03-02 NOTE — TELEPHONE ENCOUNTER
I agree that this patient should be followed by a cardiologist locally  Unfortunately I do not know anybody I know in [facility] to recommend

## 2023-03-20 ENCOUNTER — ANTICOAG VISIT (OUTPATIENT)
Dept: CARDIOLOGY CLINIC | Facility: CLINIC | Age: 88
End: 2023-03-20

## 2023-03-20 DIAGNOSIS — I48.20 CHRONIC ATRIAL FIBRILLATION (HCC): ICD-10-CM

## 2023-03-20 DIAGNOSIS — Z79.01 CHRONIC ANTICOAGULATION: Primary | ICD-10-CM

## 2023-03-20 LAB — INR PPP: 2.68 (ref 0.84–1.19)

## 2023-06-15 ENCOUNTER — REMOTE DEVICE CLINIC VISIT (OUTPATIENT)
Dept: CARDIOLOGY CLINIC | Facility: CLINIC | Age: 88
End: 2023-06-15
Payer: MEDICARE

## 2023-06-15 DIAGNOSIS — Z95.0 CARDIAC PACEMAKER IN SITU: Primary | ICD-10-CM

## 2023-06-15 PROCEDURE — 93294 REM INTERROG EVL PM/LDLS PM: CPT | Performed by: INTERNAL MEDICINE

## 2023-06-15 PROCEDURE — 93296 REM INTERROG EVL PM/IDS: CPT | Performed by: INTERNAL MEDICINE

## 2023-06-15 NOTE — PROGRESS NOTES
Results for orders placed or performed in visit on 06/15/23   Cardiac EP device report    Narrative    SJM SINGLE PM/NOT MRI CONDITIONAL  MERLIN TRANSMISSION: BATTERY VOLTAGE ADEQUATE (2 6 YRS)  -27%  ALL AVAILABLE LEAD PARAMETERS WITHIN NORMAL LIMITS  NO SIGNIFICANT HIGH RATE EPISODES  NORMAL DEVICE FUNCTION   GV

## 2023-06-16 ENCOUNTER — OFFICE VISIT (OUTPATIENT)
Dept: CARDIOLOGY CLINIC | Facility: CLINIC | Age: 88
End: 2023-06-16
Payer: MEDICARE

## 2023-06-16 VITALS
SYSTOLIC BLOOD PRESSURE: 122 MMHG | WEIGHT: 212 LBS | HEART RATE: 90 BPM | DIASTOLIC BLOOD PRESSURE: 80 MMHG | BODY MASS INDEX: 33.27 KG/M2 | RESPIRATION RATE: 16 BRPM | OXYGEN SATURATION: 98 % | HEIGHT: 67 IN

## 2023-06-16 DIAGNOSIS — I49.5 SSS (SICK SINUS SYNDROME) (HCC): ICD-10-CM

## 2023-06-16 DIAGNOSIS — I50.32 CHRONIC DIASTOLIC CONGESTIVE HEART FAILURE (HCC): ICD-10-CM

## 2023-06-16 DIAGNOSIS — I48.20 CHRONIC ATRIAL FIBRILLATION (HCC): Primary | ICD-10-CM

## 2023-06-16 DIAGNOSIS — Z79.01 CHRONIC ANTICOAGULATION: ICD-10-CM

## 2023-06-16 DIAGNOSIS — E78.2 COMBINED HYPERLIPIDEMIA: ICD-10-CM

## 2023-06-16 PROCEDURE — 99214 OFFICE O/P EST MOD 30 MIN: CPT | Performed by: INTERNAL MEDICINE

## 2023-06-16 NOTE — PROGRESS NOTES
[facility] [place]  [address]  [facility] [id number]  Cardiology Follow Up    Valeriy Briones [facility]  1934  [phone]      1  Chronic atrial fibrillation        2  Chronic anticoagulation        3  Chronic diastolic congestive heart failure ([facility] [address] )        4  SSS (sick sinus syndrome) [facility])            Chief Complaint   Patient presents with   • Follow-up       Interval History: Patient presents for follow-up visit  Patient denies any history of chest pain shortness of breath  Patient denies any history of orthopnea PND  No history of presyncope syncope  Patient states compliance with the present list of medications  Patient has history of chronic lower extremity edema left more than right  She is regularly followed by the pacemaker clinic as well as getting PT/INR regularly  No recent blood work  Patient Active Problem List   Diagnosis   • Chronic atrial fibrillation (HCC)   • Chronic anticoagulation   • Hyperlipemia, mixed   • Lower extremity edema   • Cardiac pacemaker in situ     Past Medical History:   Diagnosis Date   • Atrial fibrillation [facility])    • Chest pain    • CHF (congestive heart failure) ([facility])    • Diabetes mellitus ([facility] )    • Difficulty breathing    • HTN (hypertension)    • Hyperlipidemia    • Sinoatrial node dysfunction (HCC)    • SOB (shortness of breath)      Social History     Socioeconomic History   • Marital status:       Spouse name: Not on file   • Number of children: Not on file   • Years of education: Not on file   • Highest education level: Not on file   Occupational History   • Not on file   Tobacco Use   • Smoking status: Never   • Smokeless tobacco: Never   Substance and Sexual Activity   • Alcohol use: No   • Drug use: Not on file   • Sexual activity: Not on file   Other Topics Concern   • Not on file   Social History Narrative   • Not on file     Social Determinants of Health     Financial Resource Strain: Not on file   Food Insecurity: Not on file   Transportation Needs: Not on file   Physical Activity: Not on file   Stress: Not on file   Social Connections: Not on file   Intimate Partner Violence: Not on file   Housing Stability: Not on file      Family History   Problem Relation Age of Onset   • Coronary artery disease Mother    • Coronary artery disease Father    • Hypertension Father      Past Surgical History:   Procedure Laterality Date   • ANGIOPLASTY     • APPENDECTOMY     • CARDIAC CATHETERIZATION     • CATARACT EXTRACTION     • CHOLECYSTECTOMY     • CORONARY ANGIOPLASTY     • INSERT / Jaleesa Jade / REMOVE PACEMAKER     • TUBAL LIGATION         Current Outpatient Medications:   •  aspirin 81 MG tablet, Take 1 tablet by mouth daily, Disp: , Rfl:   •  atorvastatin (LIPITOR) 20 mg tablet, 20 mg daily  , Disp: , Rfl:   •  Cholecalciferol (VITAMIN D-3) 5000 units TABS, Take by mouth daily, Disp: , Rfl:   •  furosemide (LASIX) 40 mg tablet, Take 1 tablet (40 mg total) by mouth 2 (two) times a day, Disp: 180 tablet, Rfl: 3  •  insulin detemir (LEVEMIR FLEXTOUCH) 100 Units/mL injection pen, Inject under the skin daily at bedtime 10 units in the am and 10 units in the pm , Disp: , Rfl:   •  insulin lispro (HumaLOG) 100 units/mL injection pen, Inject under the skin 3 (three) times a day with meals 9 units am, 11 units lunch, 14 units pm , Disp: , Rfl:   •  Insulin Pen Needle (B-D ULTRAFINE III SHORT PEN) 31G X 8 MM MISC, by Does not apply route , Disp: , Rfl:   •  Insulin Pen Needle 31G X 5 MM MISC, by Does not apply route, Disp: , Rfl:   •  metoprolol tartrate (LOPRESSOR) 50 mg tablet, Take 50 mg by mouth every 12 (twelve) hours  , Disp: , Rfl:   •  mupirocin (BACTROBAN) 2 % ointment, , Disp: , Rfl:   •  ONE TOUCH ULTRA TEST test strip, , Disp: , Rfl:   •  potassium chloride (K-DUR,KLOR-CON) 20 mEq tablet, Take 1 tablet (20 mEq total) by mouth 2 (two) times a day, Disp: 180 tablet, Rfl: 3  •  vitamin B-12 (CYANOCOBALAMIN) 100 MCG tablet, "Take by mouth, Disp: , Rfl:   •  warfarin (COUMADIN) 3 mg tablet, 1 tab daily or as directed by provider, Disp: 90 tablet, Rfl: 3  •  warfarin (COUMADIN) 5 mg tablet, Take 1 tablet daily or as directed, Disp: 90 tablet, Rfl: 3  •  betamethasone, augmented, (DIPROLENE-AF) 0 05 % cream, , Disp: , Rfl:   Allergies   Allergen Reactions   • Anacin  [Aspirin-Caffeine]    • Bacitracin-Polymyxin B    • Neosporin  [Neomycin-Bacitracin Zn-Polymyx]        Labs:  No visits with results within 2 Month(s) from this visit  Latest known visit with results is:   [address] visit on 03/20/2023   Component Date Value   • INR 03/20/2023 2 68 (A)      Imaging: Cardiac EP device report    Result Date: 6/15/2023  Narrative: SJM SINGLE PM/NOT MRI CONDITIONAL MERLIN TRANSMISSION: BATTERY VOLTAGE ADEQUATE (2 6 YRS)  -27%  ALL AVAILABLE LEAD PARAMETERS WITHIN NORMAL LIMITS  NO SIGNIFICANT HIGH RATE EPISODES  NORMAL DEVICE FUNCTION  GV       Review of Systems:  Review of Systems   REVIEW OF SYSTEMS:  Constitutional:  Denies fever or chills   Eyes:  Denies change in visual acuity   HENT:  Denies nasal congestion or sore throat   Respiratory:  Denies cough or shortness of breath   Cardiovascular:   edema   GI:  Denies abdominal pain, nausea, vomiting, bloody stools or diarrhea   :  Denies dysuria, frequency, difficulty in micturition and nocturia  Musculoskeletal:  Denies back pain or joint pain   Neurologic:  Denies headache, focal weakness or sensory changes   Endocrine:  Denies polyuria or polydipsia   Lymphatic:  Denies swollen glands   Psychiatric:  Denies depression or anxiety    Physical Exam:    /80 (BP Location: Left arm, Patient Position: Sitting, Cuff Size: Standard)   Pulse 90   Resp 16   Ht 5' 7\" (1 702 m)   Wt 96 2 kg (212 lb)   SpO2 98%   BMI 33 20 kg/m²     Physical Exam   PHYSICAL EXAM:  General:  Patient is not in acute distress   Head: Normocephalic, Atraumatic    HEENT:  Both pupils normal-size atraumatic, " normocephalic, nonicteric  Neck:  JVP not raised  Trachea central  No carotid bruit  Respiratory:  normal breath sounds no crackles  no rhonchi  Cardiovascular: Irregularly irregular   GI:  Abdomen soft nontender  No organomegaly  Lymphatic:  No cervical or inguinal lymphadenopathy  Neurologic:  Patient is awake alert, oriented   Grossly nonfocal  Extremities 1+ edema left more than right    Pacemaker interrogation data reviewed  Normally functioning pacemaker  Ventricular pacing 27%  Battery status 2-1/2 years  Discussion/Summary:  Patient with multiple medical problems who seems to be doing reasonably well from cardiac standpoint  Previous studies reviewed with patient  Medications reviewed and possible side effects discussed  concepts of cardiovascular disease , signs and symptoms of heart disease  Dietary and risk factor modification reinforced  All questions answered  Safety measures reviewed  Patient advised to report any problems prompting medical attention  Risks and benefits  and alternatives of anticoagulation to prevent thromboembolic risk from atrial fibrillation discussed at length  Patient to report any bleeding issues  Patient will continue to follow-up with pacemaker clinic  Check blood work  Symptoms to watch her from cardiac standpoint discussed with patient  Patient will follow-up with primary care physician  Follow-up in 1 year or earlier as needed  Patient is agreeable with the plan of care

## 2023-06-21 ENCOUNTER — ANTICOAG VISIT (OUTPATIENT)
Dept: CARDIOLOGY CLINIC | Facility: CLINIC | Age: 88
End: 2023-06-21

## 2023-06-21 DIAGNOSIS — I48.20 CHRONIC ATRIAL FIBRILLATION (HCC): ICD-10-CM

## 2023-06-21 DIAGNOSIS — Z79.01 CHRONIC ANTICOAGULATION: Primary | ICD-10-CM

## 2023-06-21 LAB — INR PPP: 2.3 (ref 0.84–1.19)

## 2023-09-08 ENCOUNTER — ANTICOAG VISIT (OUTPATIENT)
Dept: CARDIOLOGY CLINIC | Facility: CLINIC | Age: 88
End: 2023-09-08

## 2023-09-08 DIAGNOSIS — Z79.01 CHRONIC ANTICOAGULATION: Primary | ICD-10-CM

## 2023-09-08 DIAGNOSIS — I48.20 CHRONIC ATRIAL FIBRILLATION (HCC): ICD-10-CM

## 2023-09-08 LAB — INR PPP: 2.96 (ref 0.84–1.19)

## 2023-09-14 ENCOUNTER — REMOTE DEVICE CLINIC VISIT (OUTPATIENT)
Dept: CARDIOLOGY CLINIC | Facility: CLINIC | Age: 88
End: 2023-09-14
Payer: MEDICARE

## 2023-09-14 DIAGNOSIS — Z95.0 PRESENCE OF PERMANENT CARDIAC PACEMAKER: Primary | ICD-10-CM

## 2023-09-14 PROCEDURE — 93296 REM INTERROG EVL PM/IDS: CPT | Performed by: INTERNAL MEDICINE

## 2023-09-14 PROCEDURE — 93294 REM INTERROG EVL PM/LDLS PM: CPT | Performed by: INTERNAL MEDICINE

## 2023-09-14 NOTE — PROGRESS NOTES
Discharge Instructions  Abdominal Pain    Abdominal pain can be caused by many things. Your evaluation today does not show the exact cause for your pain. Your doctor today has decided that it is unlikely your pain is due to a life threatening problem, or a problem requiring surgery or hospital admission. Sometimes those problems cannot be found right away, so it is very important that you follow up as directed.  Sometimes only the changes which occur over time allow the cause of your pain to be found.    Return to the Emergency Department for a recheck in 8-12 hours if your pain continues.  If your pain gets worse, changes in location, or feels different, return to the Emergency Department right away.    ADULTS:  Return to the Emergency Department right away if:      You get an oral temperature above 102oF or as directed by your doctor.    You have blood in your stools (bright red or black, tarry stools).    You keep throwing up or can t drink liquids.    You see blood when you throw up.    You can t have a bowel movement or you can t pass gas.    Your stomach gets bloated or bigger.    Your skin or the whites of your eyes look yellow.    You faint.    You have bloody, frequent or painful urination.    You have new symptoms or anything that worries you.    CHILDREN:  Return to the Emergency Department right away if your child has any of the above-listed symptoms or the following:      Pushes your hand away or screams/cries when his/her belly is touched.    You notice your child is very fussy or weak.    Your child is very tired and is too tired to eat or drink.    Your child is dehydrated.  Signs of dehydration can be:  o Your infant has had no wet diapers in 4-5 hours.  o Your older child has not passed urine in 6-8 hours.  o Your infant or child starts to have dry mouth and lips, or no saliva or tears.    PREGNANT WOMEN:  Return to the Emergency Department right away if you have any of the above-listed symptoms or  Results for orders placed or performed in visit on 09/14/23   Cardiac EP device report    Narrative    SJM SINGLE PM/NOT MRI CONDITIONAL  MERLIN TRANSMISSION: BATTERY STATUS "OK." (2.4 YRS)  23%. ALL AVAILABLE LEAD PARAMETERS WITHIN NORMAL LIMITS. NO SIGNIFICANT HIGH RATE EPISODES. NORMAL DEVICE FUNCTION. ---RÍOS the following:      You have bleeding, leaking fluid or passing tissue from the vagina.    You have worse pain or cramping, or pain in your shoulder or back.    You have vomiting that will not stop.    You have painful or bloody urination.    You have a temperature of 100oF or more.    Your baby is not moving as much as usual.    You faint.    You get a bad headache with or without eye problems and abdominal pain.    You have a convulsion or seizure.    You have unusual discharge from your vagina and abdominal pain.    Abdominal pain is pretty common during pregnancy.  Your pain may or may not be related to your pregnancy. You should follow-up closely with your OB doctor so they can evaluate you and your baby.  Until you follow-up with your regular doctor, do the following:       Avoid sex and do not put anything in your vagina.    Drink clear fluids.    Only take medications approved by your doctor.    MORE INFORMATION:    Appendicitis:  A possible cause of abdominal pain in any person who still has their appendix is acute appendicitis. Appendicitis is often hard to diagnose.  Testing does not always rule out early appendicitis or other causes of abdominal pain. Close follow-up with your doctor and re-evaluations may be needed to figure out the reason for your abdominal pain.    Follow-up:  It is very important that you make an appointment with your clinic and go to the appointment.  If you do not follow-up with your primary doctor, it may result in missing an important development which could result in permanent injury or disability and/or lasting pain.  If there is any problem keeping your appointment, call your doctor or return to the Emergency Department.    Medications:  Take your medications as directed by your doctor today.  Before using over-the-counter medications, ask your doctor and make sure to take the medications as directed.  If you have any questions about medications, ask your doctor.    Diet:   "Resume your normal diet as much as possible, but do not eat fried, fatty or spicy foods while you have pain.  Do not drink alcohol or have caffeine.  Do not smoke tobacco.    Probiotics: If you have been given an antibiotic, you may want to also take a probiotic pill or eat yogurt with live cultures. Probiotics have \"good bacteria\" to help your intestines stay healthy. Studies have shown that probiotics help prevent diarrhea and other intestine problems (including C. diff infection) when you take antibiotics. You can buy these without a prescription in the pharmacy section of the store.     If you were given a prescription for medicine here today, be sure to read all of the information (including the package insert) that comes with your prescription.  This will include important information about the medicine, its side effects, and any warnings that you need to know about.  The pharmacist who fills the prescription can provide more information and answer questions you may have about the medicine.  If you have questions or concerns that the pharmacist cannot address, please call or return to the Emergency Department.         Opioid Medication Information    Pain medications are among the most commonly prescribed medicines, so we are including this information for all our patients. If you did not receive pain medication or get a prescription for pain medicine, you can ignore it.     You may have been given a prescription for an opioid (narcotic) pain medicine and/or have received a pain medicine while here in the Emergency Department. These medicines can make you drowsy or impaired. You must not drive, operate dangerous equipment, or engage in any other dangerous activities while taking these medications. If you drive while taking these medications, you could be arrested for DUI, or driving under the influence. Do not drink any alcohol while you are taking these medications.     Opioid pain medications can cause " addiction. If you have a history of chemical dependency of any type, you are at a higher risk of becoming addicted to pain medications.  Only take these prescribed medications to treat your pain when all other options have been tried. Take it for as short a time and as few doses as possible. Store your pain pills in a secure place, as they are frequently stolen and provide a dangerous opportunity for children or visitors in your house to start abusing these powerful medications. We will not replace any lost or stolen medicine.  As soon as your pain is better, you should flush all your remaining medication.     Many prescription pain medications contain Tylenol  (acetaminophen), including Vicodin , Tylenol #3 , Norco , Lortab , and Percocet .  You should not take any extra pills of Tylenol  if you are using these prescription medications or you can get very sick.  Do not ever take more than 3000 mg of acetaminophen in any 24 hour period.    All opioids tend to cause constipation. Drink plenty of water and eat foods that have a lot of fiber, such as fruits, vegetables, prune juice, apple juice and high fiber cereal.  Take a laxative if you don t move your bowels at least every other day. Miralax , Milk of Magnesia, Colace , or Senna  can be used to keep you regular.      Remember that you can always come back to the Emergency Department if you are not able to see your regular doctor in the amount of time listed above, if you get any new symptoms, or if there is anything that worries you.          Discharge Instructions for Crohn s Disease  You have been diagnosed with Crohn s disease. Your digestive tract is swollen and inflamed. All layers of your digestive tract may be affected. Although there is no cure for Crohn s disease, you can receive treatment for the symptoms. Help manage your symptoms by following your healthcare provider s advice and watching what you eat.  Home care  Recommendations for taking care of  "yourself at home include the following:    Work closely with your healthcare provider to determine the types of treatment that are best for you.    Take your medicines exactly as directed.    Let your healthcare provider know if you are having uncomfortable side effects.    Don t stop taking your medicines without talking with your healthcare provider first.    It may be helpful to avoid certain foods for a little while. Depending on your condition, these may include caffeine (coffee, tea, and cola), spicy foods, milk products, and raw fruits and vegetables. For certain people, these can be hard to digest and can worsen symptoms in a flare-up. Your healthcare provider may have you work with a nutritionist to come up with the best food choices for you.    Try eating several small meals a day instead of 3 large ones.    Don't smoke. Tobacco smoking makes the disease worse.     Keep appointments for regular checkups even if you are not having symptoms.    Talk to your healthcare provider about surgery for Crohn s disease. Surgery won t cure Crohn s disease, but it may help control the symptoms. Only you and your healthcare provider can decide if this choice is right for you.    Learn more about your condition. Contact the Crohn s and Colitis Foundation toll-free at 103-140-4005 or www.ccfa.org  Managing nutrition  You may be able to eat most foods until you have a flare-up. But like anyone else, you need to make healthy eating choices. Some of the healthiest foods can make symptoms worse, though. Keeping track of your \"problem foods\" may be helpful. Ask your healthcare provider any questions you have about healthy eating.     When to call your healthcare provider  Call your healthcare provider right away if you have any of the following:    Severe pain or bloating in your belly after meals    Sores in your mouth    Sores in your anal area (around your rectum)    Fever of 100.4 F (38 C) or higher, or as directed by your " healthcare provider    Poor appetite or weight loss    Bloody diarrhea    Nausea or vomiting    Skin rashes or skin that weeps (or drains)    Changes in your vision   Date Last Reviewed: 8/1/2016 2000-2017 [employer]. [address] [place] [address], [place] [address]. All rights reserved. This information is not intended as a substitute for professional medical care. Always follow your healthcare professional's instructions.

## 2023-10-01 ENCOUNTER — APPOINTMENT (OUTPATIENT)
Dept: RADIOLOGY | Facility: CLINIC | Age: 88
End: 2023-10-01
Payer: MEDICARE

## 2023-10-01 ENCOUNTER — OFFICE VISIT (OUTPATIENT)
Dept: URGENT CARE | Facility: CLINIC | Age: 88
End: 2023-10-01
Payer: MEDICARE

## 2023-10-01 VITALS
TEMPERATURE: 97.7 F | DIASTOLIC BLOOD PRESSURE: 80 MMHG | BODY MASS INDEX: 32.96 KG/M2 | OXYGEN SATURATION: 96 % | WEIGHT: 210 LBS | RESPIRATION RATE: 14 BRPM | SYSTOLIC BLOOD PRESSURE: 163 MMHG | HEIGHT: 67 IN | HEART RATE: 76 BPM

## 2023-10-01 DIAGNOSIS — M25.512 ACUTE PAIN OF LEFT SHOULDER: ICD-10-CM

## 2023-10-01 DIAGNOSIS — M25.512 ACUTE PAIN OF LEFT SHOULDER: Primary | ICD-10-CM

## 2023-10-01 PROCEDURE — 73030 X-RAY EXAM OF SHOULDER: CPT

## 2023-10-01 PROCEDURE — 99213 OFFICE O/P EST LOW 20 MIN: CPT | Performed by: PREVENTIVE MEDICINE

## 2023-10-01 RX ORDER — ATORVASTATIN CALCIUM 10 MG/1
10 TABLET, FILM COATED ORAL DAILY
COMMUNITY
Start: 2023-09-12

## 2023-10-01 NOTE — PROGRESS NOTES
[facility]        NAME: Flavia Sapp is a 80 y.o. female  : 1934    MRN: [id number]  DATE: 2023  TIME: 11:49 AM    Assessment and Plan   Acute pain of left shoulder [M25.512]  1. Acute pain of left shoulder  XR shoulder 2+ vw left            Patient Instructions       Follow up with PCP in 3-5 days. Proceed to  ER if symptoms worsen. Chief Complaint     Chief Complaint   Patient presents with   • Shoulder Injury     Left shoulder injury occurred approx 3 days ago from a fall. History of Present Illness       She fell and as she fell the left shoulder hit an end table on the way down. She has trouble moving the shoulder greater than horizontal.      Review of Systems   Review of Systems   Musculoskeletal: Positive for arthralgias.          Current Medications       Current Outpatient Medications:   •  aspirin 81 MG tablet, Take 1 tablet by mouth daily, Disp: , Rfl:   •  atorvastatin (LIPITOR) 10 mg tablet, Take 10 mg by mouth daily, Disp: , Rfl:   •  atorvastatin (LIPITOR) 20 mg tablet, 20 mg daily  , Disp: , Rfl:   •  betamethasone, augmented, (DIPROLENE-AF) 0.05 % cream, , Disp: , Rfl:   •  Cholecalciferol (VITAMIN D-3) 5000 units TABS, Take by mouth daily, Disp: , Rfl:   •  furosemide (LASIX) 40 mg tablet, Take 1 tablet (40 mg total) by mouth 2 (two) times a day, Disp: 180 tablet, Rfl: 3  •  insulin detemir (LEVEMIR FLEXTOUCH) 100 Units/mL injection pen, Inject under the skin daily at bedtime 10 units in the am and 10 units in the pm , Disp: , Rfl:   •  insulin lispro (HumaLOG) 100 units/mL injection pen, Inject under the skin 3 (three) times a day with meals 9 units am, 11 units lunch, 14 units pm , Disp: , Rfl:   •  Insulin Pen Needle (B-D ULTRAFINE III SHORT PEN) 31G X 8 MM MISC, by Does not apply route , Disp: , Rfl:   •  Insulin Pen Needle 31G X 5 MM MISC, by Does not apply route, Disp: , Rfl:   •  metoprolol tartrate (LOPRESSOR) 50 mg tablet, Take 50 mg by mouth every 12 (twelve) hours  , Disp: , Rfl:   •  mupirocin (BACTROBAN) 2 % ointment, , Disp: , Rfl:   •  ONE TOUCH ULTRA TEST test strip, , Disp: , Rfl:   •  potassium chloride (K-DUR,KLOR-CON) 20 mEq tablet, Take 1 tablet (20 mEq total) by mouth 2 (two) times a day, Disp: 180 tablet, Rfl: 3  •  vitamin B-12 (CYANOCOBALAMIN) 100 MCG tablet, Take by mouth, Disp: , Rfl:   •  warfarin (COUMADIN) 3 mg tablet, 1 tab daily or as directed by provider, Disp: 90 tablet, Rfl: 3  •  warfarin (COUMADIN) 5 mg tablet, Take 1 tablet daily or as directed, Disp: 90 tablet, Rfl: 3    Current Allergies     Allergies as of 10/01/2023 - Reviewed 10/01/2023   Allergen Reaction Noted   • Anacin  [aspirin-caffeine]  01/08/2014   • Bacitracin-polymyxin b  10/05/2020   • Neosporin  [neomycin-bacitracin zn-polymyx]  01/08/2014            The following portions of the patient's history were reviewed and updated as appropriate: allergies, current medications, past family history, past medical history, past social history, past surgical history and problem list.     Past Medical History:   Diagnosis Date   • Atrial fibrillation ([address])    • Chest pain    • CHF (congestive heart failure) ([address])    • Diabetes mellitus ([address])    • Difficulty breathing    • HTN (hypertension)    • Hyperlipidemia    • Sinoatrial node dysfunction (HCC)    • SOB (shortness of breath)        Past Surgical History:   Procedure Laterality Date   • ANGIOPLASTY     • APPENDECTOMY     • CARDIAC CATHETERIZATION     • CATARACT EXTRACTION     • CHOLECYSTECTOMY     • CORONARY ANGIOPLASTY     • INSERT / Neto Barter / REMOVE PACEMAKER     • TUBAL LIGATION         Family History   Problem Relation Age of Onset   • Coronary artery disease Mother    • Coronary artery disease Father    • Hypertension Father          Medications have been verified.         Objective   /80   Pulse 76   Temp 97.7 °F (36.5 °C)   Resp 14   Ht 5' 7" (1.702 m)   Wt 95.3 kg (210 lb)   SpO2 96%   BMI 32.89 kg/m²   No LMP recorded. Physical Exam     Physical Exam  Musculoskeletal:      Comments: Left shoulder is not warm red or swollen. There is point tenderness near the acromion when I palpate with my fingers.        X-ray shows no acute changes

## 2023-10-01 NOTE — PATIENT INSTRUCTIONS
The final reading of the x-ray will come tonight or tomorrow morning to reconfirm my reading. If the x-ray is read as negative and she is not improving over the next several days I think physical therapy would be an effective treatment. Also ibuprofen 2 tabs 4 times a day.

## 2023-11-08 NOTE — TELEPHONE ENCOUNTER
Not her patient PT HAD BW DONE AT HER NEW PCP IN [place] A [place] BEFORE [place]  [address] [facility] [phone]  PT GAVE NEW DR HER STANDING ORDER  PT IS WAITING FOR DIRECTIONS   Ruy Mcfadden

## 2023-12-08 ENCOUNTER — ANTICOAG VISIT (OUTPATIENT)
Dept: CARDIOLOGY CLINIC | Facility: CLINIC | Age: 88
End: 2023-12-08

## 2023-12-08 ENCOUNTER — TELEPHONE (OUTPATIENT)
Dept: CARDIOLOGY CLINIC | Facility: CLINIC | Age: 88
End: 2023-12-08

## 2023-12-08 DIAGNOSIS — Z79.01 CHRONIC ANTICOAGULATION: Primary | ICD-10-CM

## 2023-12-08 DIAGNOSIS — I48.20 CHRONIC ATRIAL FIBRILLATION (HCC): ICD-10-CM

## 2023-12-08 LAB — INR PPP: 2.4 (ref 0.84–1.19)

## 2023-12-08 NOTE — TELEPHONE ENCOUNTER
Patient Aurora Kemp [phone] contacting office requesting standing INR order to be faxed to [facility] to the following fax number:    [phone]

## 2023-12-14 ENCOUNTER — REMOTE DEVICE CLINIC VISIT (OUTPATIENT)
Dept: CARDIOLOGY CLINIC | Facility: CLINIC | Age: 88
End: 2023-12-14
Payer: MEDICARE

## 2023-12-14 DIAGNOSIS — Z95.0 CARDIAC PACEMAKER IN SITU: Primary | ICD-10-CM

## 2023-12-14 PROCEDURE — 93296 REM INTERROG EVL PM/IDS: CPT | Performed by: INTERNAL MEDICINE

## 2023-12-14 PROCEDURE — 93294 REM INTERROG EVL PM/LDLS PM: CPT | Performed by: INTERNAL MEDICINE

## 2023-12-14 NOTE — PROGRESS NOTES
Results for orders placed or performed in visit on 12/14/23   Cardiac EP device report    Narrative    SJM SINGLE PM/NOT MRI CONDITIONAL  MERLIN TRANSMISSION: BATTERY VOLTAGE ADEQUATE (2.3 YRS). -24%. ALL AVAILABLE LEAD PARAMETERS WITHIN NORMAL LIMITS. NO SIGNIFICANT HIGH RATE EPISODES. NORMAL DEVICE FUNCTION.  GV

## 2023-12-15 ENCOUNTER — ANTICOAG VISIT (OUTPATIENT)
Dept: CARDIOLOGY CLINIC | Facility: CLINIC | Age: 88
End: 2023-12-15

## 2023-12-15 DIAGNOSIS — I48.20 CHRONIC ATRIAL FIBRILLATION (HCC): ICD-10-CM

## 2023-12-15 DIAGNOSIS — Z79.01 CHRONIC ANTICOAGULATION: Primary | ICD-10-CM

## 2023-12-15 LAB — INR PPP: 2.48 (ref 0.84–1.19)

## 2024-01-10 DIAGNOSIS — I50.32 CHRONIC DIASTOLIC CONGESTIVE HEART FAILURE (HCC): ICD-10-CM

## 2024-01-10 RX ORDER — POTASSIUM CHLORIDE 20 MEQ/1
20 TABLET, EXTENDED RELEASE ORAL 2 TIMES DAILY
Qty: 180 TABLET | Refills: 3 | Status: SHIPPED | OUTPATIENT
Start: 2024-01-10

## 2024-02-23 ENCOUNTER — ANTICOAG VISIT (OUTPATIENT)
Dept: CARDIOLOGY CLINIC | Facility: CLINIC | Age: 89
End: 2024-02-23

## 2024-02-23 DIAGNOSIS — Z79.01 CHRONIC ANTICOAGULATION: Primary | ICD-10-CM

## 2024-02-23 DIAGNOSIS — I48.20 CHRONIC ATRIAL FIBRILLATION (HCC): ICD-10-CM

## 2024-02-23 LAB — INR PPP: 2.16 (ref 0.84–1.19)

## 2024-03-07 ENCOUNTER — IN-CLINIC DEVICE VISIT (OUTPATIENT)
Dept: CARDIOLOGY CLINIC | Facility: CLINIC | Age: 89
End: 2024-03-07
Payer: MEDICARE

## 2024-03-07 DIAGNOSIS — Z95.0 PRESENCE OF PERMANENT CARDIAC PACEMAKER: Primary | ICD-10-CM

## 2024-03-07 PROCEDURE — 93279 PRGRMG DEV EVAL PM/LDLS PM: CPT | Performed by: INTERNAL MEDICINE

## 2024-03-07 NOTE — PROGRESS NOTES
[facility] PM/NOT MRI CONDITIONAL   DEVICE INTERROGATED IN THE [facility] OFFICE:  BATTERY VOLTAGE ADEQUATE (2.1 YR.).   25%.  ALL LEAD PARAMETERS WITHIN NORMAL LIMITS.  1 HVR EPISODE SHOWING RVR, UP  BPM.  PATIENT TAKES ASA, WARFARIN, AND METOPROLOL.  NO PROGRAMMING CHANGES MADE TO DEVICE PARAMETERS.  NORMAL DEVICE FUNCTION.  RG

## 2024-04-15 ENCOUNTER — TELEPHONE (OUTPATIENT)
Age: 89
End: 2024-04-15

## 2024-04-15 NOTE — TELEPHONE ENCOUNTER
Patient calls in regarding lab order.    She would like a new order for INR draws to be faxed to [facility] in [facility].  Patient is also awaiting to hear about INR results from Thursday 4/11.

## 2024-04-16 ENCOUNTER — ANTICOAG VISIT (OUTPATIENT)
Dept: CARDIOLOGY CLINIC | Facility: CLINIC | Age: 89
End: 2024-04-16

## 2024-04-16 DIAGNOSIS — Z79.01 LONG TERM (CURRENT) USE OF ANTICOAGULANTS: Primary | ICD-10-CM

## 2024-04-16 DIAGNOSIS — Z79.01 CHRONIC ANTICOAGULATION: Primary | ICD-10-CM

## 2024-04-16 DIAGNOSIS — I48.20 CHRONIC ATRIAL FIBRILLATION (HCC): ICD-10-CM

## 2024-04-16 LAB — INR PPP: 2.56 (ref 0.84–1.19)

## 2024-04-16 NOTE — TELEPHONE ENCOUNTER
Called [facility]. INR is 2.56. Advised pt to stay on the same dose and retest in 1 month. Sent new RX to [facility] ([facility]) [phone]

## 2024-06-06 ENCOUNTER — TELEPHONE (OUTPATIENT)
Age: 89
End: 2024-06-06

## 2024-06-06 DIAGNOSIS — I48.20 CHRONIC ATRIAL FIBRILLATION (HCC): ICD-10-CM

## 2024-06-06 RX ORDER — WARFARIN SODIUM 5 MG/1
TABLET ORAL
Qty: 90 TABLET | Refills: 3 | Status: SHIPPED | OUTPATIENT
Start: 2024-06-06

## 2024-06-06 NOTE — TELEPHONE ENCOUNTER
Patient called to see if prescription was sent to [facility]. Prescription was called in today at 1:21 pm

## 2024-06-10 ENCOUNTER — REMOTE DEVICE CLINIC VISIT (OUTPATIENT)
Dept: CARDIOLOGY CLINIC | Facility: CLINIC | Age: 89
End: 2024-06-10
Payer: MEDICARE

## 2024-06-10 DIAGNOSIS — Z95.0 PRESENCE OF PERMANENT CARDIAC PACEMAKER: Primary | ICD-10-CM

## 2024-06-10 PROCEDURE — 93294 REM INTERROG EVL PM/LDLS PM: CPT | Performed by: INTERNAL MEDICINE

## 2024-06-10 PROCEDURE — 93296 REM INTERROG EVL PM/IDS: CPT | Performed by: INTERNAL MEDICINE

## 2024-09-09 ENCOUNTER — REMOTE DEVICE CLINIC VISIT (OUTPATIENT)
Dept: CARDIOLOGY CLINIC | Facility: CLINIC | Age: 89
End: 2024-09-09
Payer: MEDICARE

## 2024-09-09 DIAGNOSIS — I48.91 ATRIAL FIBRILLATION, UNSPECIFIED TYPE (HCC): ICD-10-CM

## 2024-09-09 DIAGNOSIS — I49.5 SSS (SICK SINUS SYNDROME) (HCC): Primary | ICD-10-CM

## 2024-09-09 PROCEDURE — 93294 REM INTERROG EVL PM/LDLS PM: CPT | Performed by: INTERNAL MEDICINE

## 2024-09-09 PROCEDURE — 93296 REM INTERROG EVL PM/IDS: CPT | Performed by: INTERNAL MEDICINE

## 2024-09-09 NOTE — PROGRESS NOTES
Results for orders placed or performed in visit on 09/09/24   Cardiac EP device report    Narrative    SJM SC PM/NOT MRI CONDITIONAL  MERLIN TRANSMISSION: BATTERY VOLTAGE ADEQUATE (1.7 YRS). : 23%. ALL AVAILABLE LEAD PARAMETERS WITHIN NORMAL LIMITS. NO SIGNIFICANT HIGH RATE EPISODES. NORMAL DEVICE FUNCTION. CH

## 2024-11-08 ENCOUNTER — TELEPHONE (OUTPATIENT)
Age: 89
End: 2024-11-08

## 2024-11-08 NOTE — TELEPHONE ENCOUNTER
Pt called in requesting an order for INR be placed in her chart.  Informed the pt she currently has an active standing order for every 2 weeks in her chart.  Pt states the [facility] she went to this morning did not see it. Informed the pt it is in her chart and if she has any further issues to give us a call back and we can try to fax the order directly to [facility].  Pt verbalized understanding and has no further questions at this time.

## 2024-11-14 ENCOUNTER — TELEPHONE (OUTPATIENT)
Age: 89
End: 2024-11-14

## 2024-11-14 NOTE — TELEPHONE ENCOUNTER
Received a call from [facility] stating that they did receive the fax but the phone number that they had for the patient was out of service and was requesting the phone number that we had on file for her. [facility] verified patients name birth date and address on file. Verified patients phone number with the lab.

## 2024-11-14 NOTE — TELEPHONE ENCOUNTER
Received a call from Monse, ravindra to have her coumadin lab order faxed to [facility].    Fax number given was [phone], advised routed/faxed order to [facility], will also notify Ross [facility] to follow up and make sure they received.     Advised pt to also follow up with [facility].    Verbally understood

## 2024-11-15 ENCOUNTER — ANTICOAG VISIT (OUTPATIENT)
Dept: CARDIOLOGY CLINIC | Facility: CLINIC | Age: 89
End: 2024-11-15

## 2024-11-15 DIAGNOSIS — I48.20 CHRONIC ATRIAL FIBRILLATION (HCC): ICD-10-CM

## 2024-11-15 DIAGNOSIS — Z79.01 CHRONIC ANTICOAGULATION: Primary | ICD-10-CM

## 2024-11-15 LAB — INR PPP: 3.4 (ref 0.85–1.19)

## 2024-12-04 ENCOUNTER — ANTICOAG VISIT (OUTPATIENT)
Dept: CARDIOLOGY CLINIC | Facility: CLINIC | Age: 89
End: 2024-12-04

## 2024-12-04 DIAGNOSIS — I48.20 CHRONIC ATRIAL FIBRILLATION (HCC): ICD-10-CM

## 2024-12-04 DIAGNOSIS — Z79.01 CHRONIC ANTICOAGULATION: Primary | ICD-10-CM

## 2024-12-04 LAB — INR PPP: 2.9 (ref 0.85–1.19)

## 2024-12-09 ENCOUNTER — RESULTS FOLLOW-UP (OUTPATIENT)
Dept: CARDIOLOGY CLINIC | Facility: CLINIC | Age: 89
End: 2024-12-09

## 2024-12-09 ENCOUNTER — REMOTE DEVICE CLINIC VISIT (OUTPATIENT)
Dept: CARDIOLOGY CLINIC | Facility: CLINIC | Age: 89
End: 2024-12-09
Payer: MEDICARE

## 2024-12-09 DIAGNOSIS — I48.91 ATRIAL FIBRILLATION, UNSPECIFIED TYPE (HCC): Primary | ICD-10-CM

## 2024-12-09 DIAGNOSIS — I49.5 SSS (SICK SINUS SYNDROME) (HCC): ICD-10-CM

## 2024-12-09 PROCEDURE — 93294 REM INTERROG EVL PM/LDLS PM: CPT | Performed by: INTERNAL MEDICINE

## 2024-12-09 PROCEDURE — 93296 REM INTERROG EVL PM/IDS: CPT | Performed by: INTERNAL MEDICINE

## 2024-12-09 NOTE — PROGRESS NOTES
Results for orders placed or performed in visit on 12/09/24   Cardiac EP device report    Narrative    SJM SC PM/NOT MRI CONDITIONAL  MERLIN TRANSMISSION: BATTERY VOLTAGE ADEQUATE (1.5 YRS). : 24%. ALL AVAILABLE LEAD PARAMETERS WITHIN NORMAL LIMITS. NO SIGNIFICANT HIGH RATE EPISODES. NORMAL DEVICE FUNCTION. CH

## 2025-01-06 ENCOUNTER — OFFICE VISIT (OUTPATIENT)
Dept: CARDIOLOGY CLINIC | Facility: CLINIC | Age: OVER 89
End: 2025-01-06
Payer: MEDICARE

## 2025-01-06 VITALS
DIASTOLIC BLOOD PRESSURE: 89 MMHG | BODY MASS INDEX: 32.42 KG/M2 | SYSTOLIC BLOOD PRESSURE: 150 MMHG | OXYGEN SATURATION: 96 % | HEART RATE: 78 BPM | RESPIRATION RATE: 16 BRPM | WEIGHT: 207 LBS

## 2025-01-06 DIAGNOSIS — I48.20 CHRONIC ATRIAL FIBRILLATION (HCC): Primary | ICD-10-CM

## 2025-01-06 DIAGNOSIS — I70.209 ATHEROSCLEROTIC PERIPHERAL VASCULAR DISEASE (HCC): ICD-10-CM

## 2025-01-06 DIAGNOSIS — I50.32 CHRONIC DIASTOLIC CONGESTIVE HEART FAILURE (HCC): ICD-10-CM

## 2025-01-06 DIAGNOSIS — Z95.0 CARDIAC PACEMAKER IN SITU: ICD-10-CM

## 2025-01-06 DIAGNOSIS — R06.00 DYSPNEA, UNSPECIFIED TYPE: ICD-10-CM

## 2025-01-06 PROCEDURE — 99214 OFFICE O/P EST MOD 30 MIN: CPT

## 2025-01-06 NOTE — ASSESSMENT & PLAN NOTE
Rate control adequate   Continue metoprolol tartrate 50 mg twice daily  Continue warfarin for AC

## 2025-01-06 NOTE — ASSESSMENT & PLAN NOTE
Wt Readings from Last 3 Encounters:   01/06/25 93.9 kg (207 lb)   10/01/23 95.3 kg (210 lb)   06/16/23 96.2 kg (212 lb)     Patient appears mildly hypervolemic  Some lower extremity swelling noted, take extra 40 mg tablet of Lasix today.  Continue Lasix 40 mg twice daily  Continue daily weights and low-sodium diet.  Patient advised to maintain elevated legs and encouraged to try compression stockings if she can tolerate.

## 2025-01-06 NOTE — PROGRESS NOTES
[facility] [place]  [address]  [place] [address]  Cardiology Office Note    Monse Mast 89 y.o. female MRN: [id number]    01/06/25          Assessment/Plan:  Assessment & Plan  Dyspnea, unspecified type  Patient had dyspnea and increased fatigue for 2 weeks  Will further evaluate with TTE.  Chronic atrial fibrillation (HCC)  Rate control adequate   Continue metoprolol tartrate 50 mg twice daily  Continue warfarin for AC    Chronic diastolic congestive heart failure (HCC)  Wt Readings from Last 3 Encounters:   01/06/25 93.9 kg (207 lb)   10/01/23 95.3 kg (210 lb)   06/16/23 96.2 kg (212 lb)     Patient appears mildly hypervolemic  Some lower extremity swelling noted, take extra 40 mg tablet of Lasix today.  Continue Lasix 40 mg twice daily  Continue daily weights and low-sodium diet.  Patient advised to maintain elevated legs and encouraged to try compression stockings if she can tolerate.  Atherosclerotic peripheral vascular disease (HCC)    Cardiac pacemaker in situ  Patient follows with device clinic.         Follow up: 3 months or sooner as needed  All questions and concerns addressed.  Patient was advised to report any problems requiring medical attention.          1. Chronic atrial fibrillation (HCC)  Echo complete w/ contrast if indicated      2. Dyspnea, unspecified type  Echo complete w/ contrast if indicated      3. Chronic diastolic congestive heart failure (HCC)        4. Atherosclerotic peripheral vascular disease (HCC)        5. Cardiac pacemaker in situ            HPI: Monse Mast is a 89 y.o. year old female with PMH of chronic atrial fibrillation, s/p PPM, chronic HFpEF who presents for fatigue and shortness of breath. Patient is known to Dr. Zamora.    Around [place], patient was feeling very fatigued and weak for about 2 weeks. Patient had a few episodes of diarrhea, some lightheadedness and dizziness at the time. Patient notes that she is  starting to feel better today.    Patient denies any bleeding issues on Coumadin.  She reports adherence to her medication.  She notes some swelling in her lower legs.  She denies any chest pain.    Patient's son is at today's appointment.  He notes that they checked her blood pressure and oxygen level at home and they have all remained within normal limits.    Patient has PPM and follows with the device clinic.  No significant events have been noted on recent checks.    Patient notes that she is not very good at staying hydrated.      Patient was instructed to call the office or seek medical attention if any significant chest pain, shortness of breath, palpitations, or lower extremity swelling develop. All medications reviewed and patient is tolerating medications without side effects.     Social history:   Patient denies tobacco, significant alcohol, or recreational drug use.          Patient Active Problem List   Diagnosis    Chronic atrial fibrillation (HCC)    Chronic anticoagulation    Hyperlipemia, mixed    Lower extremity edema    Cardiac pacemaker in situ    Chronic diastolic congestive heart failure (HCC)    Atherosclerotic peripheral vascular disease (HCC)       Allergies   Allergen Reactions    Anacin  [Aspirin-Caffeine]     Bacitracin-Polymyxin B     Neosporin  [Neomycin-Bacitracin Zn-Polymyx]          Current Outpatient Medications:     aspirin 81 MG tablet, Take 1 tablet by mouth daily, Disp: , Rfl:     atorvastatin (LIPITOR) 10 mg tablet, Take 10 mg by mouth daily, Disp: , Rfl:     atorvastatin (LIPITOR) 20 mg tablet, 20 mg daily  , Disp: , Rfl:     betamethasone, augmented, (DIPROLENE-AF) 0.05 % cream, , Disp: , Rfl:     Cholecalciferol (VITAMIN D-3) 5000 units TABS, Take by mouth daily, Disp: , Rfl:     furosemide (LASIX) 40 mg tablet, Take 1 tablet (40 mg total) by mouth 2 (two) times a day, Disp: 180 tablet, Rfl: 3    insulin detemir (LEVEMIR FLEXTOUCH) 100 Units/mL injection pen, Inject under  the skin daily at bedtime 10 units in the am and 10 units in the pm , Disp: , Rfl:     insulin lispro (HumaLOG) 100 units/mL injection pen, Inject under the skin 3 (three) times a day with meals 9 units am, 11 units lunch, 14 units pm , Disp: , Rfl:     Insulin Pen Needle (B-D ULTRAFINE III SHORT PEN) 31G X 8 MM MISC, by Does not apply route , Disp: , Rfl:     Insulin Pen Needle 31G X 5 MM MISC, by Does not apply route, Disp: , Rfl:     metoprolol tartrate (LOPRESSOR) 50 mg tablet, Take 50 mg by mouth every 12 (twelve) hours  , Disp: , Rfl:     mupirocin (BACTROBAN) 2 % ointment, , Disp: , Rfl:     ONE TOUCH ULTRA TEST test strip, , Disp: , Rfl:     potassium chloride (K-DUR,KLOR-CON) 20 mEq tablet, Take 1 tablet (20 mEq total) by mouth 2 (two) times a day, Disp: 180 tablet, Rfl: 3    vitamin B-12 (CYANOCOBALAMIN) 100 MCG tablet, Take by mouth, Disp: , Rfl:     warfarin (COUMADIN) 3 mg tablet, 1 tab daily or as directed by provider, Disp: 90 tablet, Rfl: 3    warfarin (COUMADIN) 5 mg tablet, Take 1 tablet daily or as directed, Disp: 90 tablet, Rfl: 3    Past Medical History:   Diagnosis Date    Atrial fibrillation (HCC)     Chest pain     CHF (congestive heart failure) (HCC)     Diabetes mellitus (HCC)     Difficulty breathing     HTN (hypertension)     Hyperlipidemia     Sinoatrial node dysfunction (HCC)     SOB (shortness of breath)        Family History   Problem Relation Age of Onset    Coronary artery disease Mother     Coronary artery disease Father     Hypertension Father        Past Surgical History:   Procedure Laterality Date    ANGIOPLASTY      APPENDECTOMY      CARDIAC CATHETERIZATION      CATARACT EXTRACTION      CHOLECYSTECTOMY      CORONARY ANGIOPLASTY      INSERT / REPLACE / REMOVE PACEMAKER      TUBAL LIGATION         Social History     Socioeconomic History    Marital status:      Spouse name: Not on file    Number of children: Not on file    Years of education: Not on file    Highest  education level: Not on file   Occupational History    Not on file   Tobacco Use    Smoking status: Never    Smokeless tobacco: Never   Substance and Sexual Activity    Alcohol use: No    Drug use: Not on file    Sexual activity: Not on file   Other Topics Concern    Not on file   Social History Narrative    Not on file     Social Drivers of Health     Financial Resource Strain: Not on file   Food Insecurity: Not on file   Transportation Needs: Not on file   Physical Activity: Not on file   Stress: Not on file   Social Connections: Unknown (6/18/2024)    Received from [employer]     How often do you feel lonely or isolated from those around you? (Adult - for ages 18 years and over): Not on file   Intimate Partner Violence: Not on file   Housing Stability: Not on file       Review of symptoms:   Review of Systems   Constitutional:  Positive for fatigue. Negative for chills, diaphoresis and fever.   Respiratory:  Positive for shortness of breath. Negative for cough and chest tightness.    Cardiovascular:  Negative for chest pain, palpitations and leg swelling.   Gastrointestinal:  Negative for abdominal distention, blood in stool, nausea and vomiting.   Genitourinary:  Negative for difficulty urinating.   Musculoskeletal:  Negative for arthralgias and back pain.   Neurological:  Positive for dizziness and light-headedness. Negative for syncope and headaches.   Psychiatric/Behavioral:  Negative for agitation and confusion. The patient is not nervous/anxious.         Vitals: /89 (BP Location: Left arm, Patient Position: Sitting, Cuff Size: Standard)   Pulse 78   Resp 16   Wt 93.9 kg (207 lb)   SpO2 96%   BMI 32.42 kg/m²         Physical Exam:     Physical Exam  Vitals and nursing note reviewed.   Constitutional:       General: She is not in acute distress.     Appearance: She is well-developed.   HENT:      Head: Normocephalic and atraumatic.   Eyes:      Conjunctiva/sclera: Conjunctivae  normal.   Neck:      Vascular: No carotid bruit.   Cardiovascular:      Rate and Rhythm: Normal rate and regular rhythm.      Heart sounds: Normal heart sounds. No murmur heard.  Pulmonary:      Effort: Pulmonary effort is normal. No respiratory distress.      Breath sounds: Normal breath sounds.   Abdominal:      Palpations: Abdomen is soft.      Tenderness: There is no abdominal tenderness.   Musculoskeletal:         General: No swelling.      Cervical back: Neck supple.      Right lower le+ Pitting Edema present.      Left lower le+ Pitting Edema present.   Skin:     General: Skin is warm and dry.      Capillary Refill: Capillary refill takes less than 2 seconds.   Neurological:      Mental Status: She is alert and oriented to person, place, and time.   Psychiatric:         Mood and Affect: Mood normal.            Thank you for allowing me to participate in the care and evaluation of your patient.  Should you have any questions, please feel free to contact me.

## 2025-01-14 ENCOUNTER — HOSPITAL ENCOUNTER (OUTPATIENT)
Dept: NON INVASIVE DIAGNOSTICS | Facility: CLINIC | Age: OVER 89
Discharge: HOME | End: 2025-01-14
Payer: MEDICARE

## 2025-01-14 VITALS
DIASTOLIC BLOOD PRESSURE: 89 MMHG | HEART RATE: 78 BPM | WEIGHT: 207 LBS | BODY MASS INDEX: 32.49 KG/M2 | SYSTOLIC BLOOD PRESSURE: 150 MMHG | HEIGHT: 67 IN

## 2025-01-14 DIAGNOSIS — I48.20 CHRONIC ATRIAL FIBRILLATION (HCC): ICD-10-CM

## 2025-01-14 DIAGNOSIS — R06.00 DYSPNEA, UNSPECIFIED TYPE: ICD-10-CM

## 2025-01-14 LAB
AORTIC ROOT: 3.6 CM
ASCENDING AORTA: 3.5 CM
BSA FOR ECHO PROCEDURE: 2.05 M2
FRACTIONAL SHORTENING: 32 (ref 28–44)
INTERVENTRICULAR SEPTUM IN DIASTOLE (PARASTERNAL SHORT AXIS VIEW): 1.2 CM
INTERVENTRICULAR SEPTUM: 1.2 CM (ref 0.6–1.1)
LAAS-AP2: 25.6 CM2
LAAS-AP4: 26.4 CM2
LEFT ATRIUM SIZE: 4.3 CM
LEFT ATRIUM VOLUME (MOD BIPLANE): 81 ML
LEFT ATRIUM VOLUME INDEX (MOD BIPLANE): 39.5 ML/M2
LEFT INTERNAL DIMENSION IN SYSTOLE: 2.8 CM (ref 2.1–4)
LEFT VENTRICULAR INTERNAL DIMENSION IN DIASTOLE: 4.1 CM (ref 3.5–6)
LEFT VENTRICULAR POSTERIOR WALL IN END DIASTOLE: 1.2 CM
LEFT VENTRICULAR STROKE VOLUME: 42 ML
LV EF US.2D.A4C+ESTIMATED: 57 %
LVSV (TEICH): 42 ML
MV E'TISSUE VEL-SEP: 7 CM/S
RA PRESSURE ESTIMATED: 8 MMHG
RIGHT ATRIUM AREA SYSTOLE A4C: 23.3 CM2
RIGHT VENTRICLE ID DIMENSION: 4 CM
RV PSP: 50 MMHG
SL CV LEFT ATRIUM LENGTH A2C: 6.6 CM
SL CV PED ECHO LEFT VENTRICLE DIASTOLIC VOLUME (MOD BIPLANE) 2D: 72 ML
SL CV PED ECHO LEFT VENTRICLE SYSTOLIC VOLUME (MOD BIPLANE) 2D: 30 ML
TR MAX PG: 42 MMHG
TR PEAK VELOCITY: 3.3 M/S
TRICUSPID ANNULAR PLANE SYSTOLIC EXCURSION: 1.7 CM
TRICUSPID VALVE PEAK REGURGITATION VELOCITY: 3.5 M/S

## 2025-01-14 PROCEDURE — 93306 TTE W/DOPPLER COMPLETE: CPT

## 2025-01-14 PROCEDURE — 93306 TTE W/DOPPLER COMPLETE: CPT | Performed by: INTERNAL MEDICINE

## 2025-01-16 ENCOUNTER — ANTICOAG VISIT (OUTPATIENT)
Dept: CARDIOLOGY CLINIC | Facility: CLINIC | Age: OVER 89
End: 2025-01-16

## 2025-01-16 DIAGNOSIS — Z79.01 CHRONIC ANTICOAGULATION: Primary | ICD-10-CM

## 2025-01-16 DIAGNOSIS — I48.20 CHRONIC ATRIAL FIBRILLATION (HCC): ICD-10-CM

## 2025-01-16 LAB — INR PPP: 2.83 (ref 0.85–1.19)

## 2025-01-30 DIAGNOSIS — I50.32 CHRONIC DIASTOLIC CONGESTIVE HEART FAILURE (HCC): ICD-10-CM

## 2025-01-30 RX ORDER — POTASSIUM CHLORIDE 1500 MG/1
20 TABLET, EXTENDED RELEASE ORAL 2 TIMES DAILY
Qty: 60 TABLET | Refills: 0 | Status: SHIPPED | OUTPATIENT
Start: 2025-01-30

## 2025-01-30 NOTE — TELEPHONE ENCOUNTER
Patient called refill line, she is out of the potassium and would like it called in today. Confirmed [facility].

## 2025-02-07 ENCOUNTER — RESULTS FOLLOW-UP (OUTPATIENT)
Dept: NON INVASIVE DIAGNOSTICS | Facility: HOSPITAL | Age: OVER 89
End: 2025-02-07

## 2025-02-24 DIAGNOSIS — I50.32 CHRONIC DIASTOLIC CONGESTIVE HEART FAILURE (HCC): ICD-10-CM

## 2025-02-24 RX ORDER — POTASSIUM CHLORIDE 1500 MG/1
20 TABLET, EXTENDED RELEASE ORAL 2 TIMES DAILY
Qty: 180 TABLET | Refills: 3 | Status: SHIPPED | OUTPATIENT
Start: 2025-02-24

## 2025-02-27 DIAGNOSIS — I48.20 CHRONIC ATRIAL FIBRILLATION (HCC): ICD-10-CM

## 2025-02-27 DIAGNOSIS — Z79.01 LONG TERM (CURRENT) USE OF ANTICOAGULANTS: Primary | ICD-10-CM

## 2025-02-27 RX ORDER — WARFARIN SODIUM 3 MG/1
TABLET ORAL
Qty: 90 TABLET | Refills: 3 | Status: SHIPPED | OUTPATIENT
Start: 2025-02-27

## 2025-02-28 ENCOUNTER — ANTICOAG VISIT (OUTPATIENT)
Dept: CARDIOLOGY CLINIC | Facility: CLINIC | Age: OVER 89
End: 2025-02-28
Payer: MEDICARE

## 2025-02-28 DIAGNOSIS — Z79.01 CHRONIC ANTICOAGULATION: Primary | ICD-10-CM

## 2025-02-28 DIAGNOSIS — I48.20 CHRONIC ATRIAL FIBRILLATION (HCC): ICD-10-CM

## 2025-02-28 LAB — INR PPP: 2 (ref 0.85–1.19)

## 2025-02-28 PROCEDURE — 93793 ANTICOAG MGMT PT WARFARIN: CPT

## 2025-03-21 ENCOUNTER — IN-CLINIC DEVICE VISIT (OUTPATIENT)
Dept: CARDIOLOGY CLINIC | Facility: CLINIC | Age: OVER 89
End: 2025-03-21
Payer: MEDICARE

## 2025-03-21 ENCOUNTER — RESULTS FOLLOW-UP (OUTPATIENT)
Dept: CARDIOLOGY CLINIC | Facility: CLINIC | Age: OVER 89
End: 2025-03-21

## 2025-03-21 DIAGNOSIS — I49.5 SSS (SICK SINUS SYNDROME) (HCC): ICD-10-CM

## 2025-03-21 DIAGNOSIS — I48.91 ATRIAL FIBRILLATION, UNSPECIFIED TYPE (HCC): Primary | ICD-10-CM

## 2025-03-21 PROCEDURE — 93279 PRGRMG DEV EVAL PM/LDLS PM: CPT | Performed by: INTERNAL MEDICINE

## 2025-04-17 ENCOUNTER — ANTICOAG VISIT (OUTPATIENT)
Dept: CARDIOLOGY CLINIC | Facility: CLINIC | Age: OVER 89
End: 2025-04-17

## 2025-04-17 DIAGNOSIS — Z79.01 CHRONIC ANTICOAGULATION: Primary | ICD-10-CM

## 2025-04-17 DIAGNOSIS — I48.20 CHRONIC ATRIAL FIBRILLATION (HCC): ICD-10-CM

## 2025-04-17 LAB — INR PPP: 2.7 (ref 0.85–1.19)

## 2025-06-11 DIAGNOSIS — I48.20 CHRONIC ATRIAL FIBRILLATION (HCC): ICD-10-CM

## 2025-06-11 RX ORDER — WARFARIN SODIUM 5 MG/1
TABLET ORAL
Qty: 90 TABLET | Refills: 3 | Status: SHIPPED | OUTPATIENT
Start: 2025-06-11

## 2025-06-11 NOTE — TELEPHONE ENCOUNTER
Reason for call:   [x] Refill   [] Prior Auth  [] Other:     Office:   [] PCP/Provider -   [x] Specialty/Provider - cardio    Medication: warfarin (COUMADIN) 5 mg tablet      Dose/Frequency: Take 1 tablet daily or as directed     Quantity: 90    Pharmacy: [id number] - [place], [place] - [address] [place] [address] Pharmacy   Does the patient have enough for 3 days?   [] Yes   [x] No - Send as HP to POD    Mail Away Pharmacy   Does the patient have enough for 10 days?   [] Yes   [] No - Send as HP to POD

## 2025-06-30 ENCOUNTER — REMOTE DEVICE CLINIC VISIT (OUTPATIENT)
Dept: CARDIOLOGY CLINIC | Facility: CLINIC | Age: OVER 89
End: 2025-06-30
Payer: MEDICARE

## 2025-06-30 DIAGNOSIS — Z95.0 PRESENCE OF PERMANENT CARDIAC PACEMAKER: Primary | ICD-10-CM

## 2025-06-30 PROCEDURE — 93294 REM INTERROG EVL PM/LDLS PM: CPT | Performed by: INTERNAL MEDICINE

## 2025-06-30 PROCEDURE — 93296 REM INTERROG EVL PM/IDS: CPT | Performed by: INTERNAL MEDICINE

## 2025-06-30 NOTE — PROGRESS NOTES
Results for orders placed or performed in visit on 06/30/25   Cardiac EP device report    Narrative    SJM SC PM/NOT MRI CONDITIONAL  MERLIN TRANSMISSION: BATTERY VOLTAGE ADEQUATE. ( 1 YR)  27%. ALL AVAILABLE LEAD PARAMETERS WITHIN NORMAL LIMITS. NO SIGNIFICANT HIGH RATE EPISODES. SINGLE PVC COUNT. NORMAL DEVICE FUNCTION.---RÍOS

## 2025-08-19 DIAGNOSIS — I50.32 CHRONIC DIASTOLIC CONGESTIVE HEART FAILURE (HCC): ICD-10-CM

## 2025-08-19 RX ORDER — POTASSIUM CHLORIDE 1500 MG/1
20 TABLET, EXTENDED RELEASE ORAL 2 TIMES DAILY
Qty: 180 TABLET | Refills: 0 | Status: SHIPPED | OUTPATIENT
Start: 2025-08-19